# Patient Record
Sex: MALE | Race: BLACK OR AFRICAN AMERICAN
[De-identification: names, ages, dates, MRNs, and addresses within clinical notes are randomized per-mention and may not be internally consistent; named-entity substitution may affect disease eponyms.]

---

## 2019-04-08 ENCOUNTER — HOSPITAL ENCOUNTER (EMERGENCY)
Dept: HOSPITAL 35 - ER | Age: 74
Discharge: HOME | End: 2019-04-08
Payer: COMMERCIAL

## 2019-04-08 VITALS — WEIGHT: 206 LBS | HEIGHT: 77 IN | BODY MASS INDEX: 24.32 KG/M2

## 2019-04-08 VITALS — SYSTOLIC BLOOD PRESSURE: 158 MMHG | DIASTOLIC BLOOD PRESSURE: 68 MMHG

## 2019-04-08 DIAGNOSIS — K40.90: Primary | ICD-10-CM

## 2019-04-08 DIAGNOSIS — E11.9: ICD-10-CM

## 2019-04-08 DIAGNOSIS — Z88.5: ICD-10-CM

## 2019-04-08 DIAGNOSIS — Z79.4: ICD-10-CM

## 2019-08-16 ENCOUNTER — HOSPITAL ENCOUNTER (OUTPATIENT)
Dept: HOSPITAL 35 - SLEEPLAB | Age: 74
End: 2019-08-16
Attending: INTERNAL MEDICINE
Payer: COMMERCIAL

## 2019-08-16 DIAGNOSIS — G47.33: Primary | ICD-10-CM

## 2019-08-18 NOTE — SLE
[facility]
[address]
[place]   [address]                     POLYSOMNOGRAPHY STUDY         
_______________________________________________________________________________
 
Name:       NAILA HERNANDEZ            Room #:                     REG OPAL 
SHIRIN#:      [id number]                       Account #:      [id number]  
Admission:  08/16/19    Attend Phys:    Kike Keller MD      
Discharge:              Date of Birth:  [birth year]  
                                                          Report #: [id number]
                                                                    [id number]   
_______________________________________________________________________________
THIS REPORT FOR:   //name//                          
 
CC: Kike Dial MD
 
DATE OF SERVICE:  08/16/2019
 
 
ATTENDING PHYSICIAN:  Dr. Minh Dial.
 
The patient is a 73-year-old who weighs 200 pounds with a BMI of 23.7.  The
patient's [place] score was 8.  The patient underwent split night study
performed at [facility].
 
During the night study, the patient spent 524 minutes in bed and slept for 426
minutes with a sleep efficiency of 81%.  Sleep latency was 6.2 minutes with a
REM latency of 61 minutes.  Overall, sleep architecture showed increased stage 1
and stage 2 sleep, normal slow wave and reduced REM sleep, which was 10% of
total sleep time.
 
During the initial diagnostic portion of the study, the patient slept for 237
minutes.  During that time, the patient had 1 obstructive apnea, no mixed or
central apneas.  The patient had 42 hypopneas.  The patient's apnea hypopnea
index was 10.9 per hour with a REM index of 21 per hour and a supine index of 18
per hour.
 
EKG monitoring revealed atrial fibrillation throughout.  Average heart rate was
51 beats per minute with a maximum 82 beats per minute.
 
No clinically significant PLMS observed.
 
Nocturnal oximetry study during the diagnostic portion revealed an average
oxygen saturation of 94% with the lowest of 85%.  A 1.2 minutes were spent in
oxygen saturation of less than 89%.
 
The patient met the criteria for CPAP initiation.  It was started at 7 cm water
and titrated up to 17 cm of water.  However, best results were seen at a
pressure of 16 cm water.  At that pressure, the patient slept for 44 minutes
including 2.5 minutes of REM sleep.  The patient had supine sleep as well.  The
patient's AHI was reduced to 0 per hour and oxygen saturation remained above
94%.
 
IMPRESSION:
1.  Mild sleep apnea-hypopnea syndrome with moderate increase during supine and
REM sleep.  Total AHI 10.9 per hour with a REM AHI of 21 per hour and a supine
 
 
 
[facility]
[address]
[place]   [address]                     POLYSOMNOGRAPHY STUDY         
_______________________________________________________________________________
 
Name:       [facility]            Room #:                     REG [facility].#:      [id number]                       Account #:      [id number]  
Admission:  08/16/19    Attend Phys:    Kike Keller MD      
Discharge:              Date of Birth:  [birth year]  
                                                          Report #: [id number]
                                                                    [id number]   
_______________________________________________________________________________
AHI of 18 per hour.
2.  No clinically significant periodic limb movements.
3.  No clinically significant nocturnal hypoxia.
4.  Abnormal EKG consistent with Atrial Fibrillation.
 
RECOMMENDATIONS:
1.  CPAP at 16 cm water completely eliminated the patient's sleep apnea and
should be used on a nightly basis.
2.  Follow up in 4-6 weeks to assess compliance with CPAP and to document
clinical improvement.
3.  Weight loss is strongly advised.
4.  Avoid CNS depressants.
5.  Cautioned regarding driving until symptoms of sleep apnea resolve with the
use of CPAP.
6.  Follow up with cardiology regarding abnormal EKG, if clinically indicated.
 
 
 
 
 
 
 
 
 
 
 
 
 
 
 
 
 
 
 
 
 
 
 
 
 
 
 
 
 
  <ELECTRONICALLY SIGNED>
   By: Kike Keller MD              
  08/18/19 2131
D: 08/18/19 1909                           _____________________________________
T: 08/18/19 1935                           Kike Keller MD                /nt

## 2019-12-08 ENCOUNTER — HOSPITAL ENCOUNTER (EMERGENCY)
Dept: HOSPITAL 35 - ER | Age: 74
Discharge: HOME | End: 2019-12-08
Payer: COMMERCIAL

## 2019-12-08 VITALS — BODY MASS INDEX: 23.97 KG/M2 | HEIGHT: 77 IN | WEIGHT: 203 LBS

## 2019-12-08 VITALS — DIASTOLIC BLOOD PRESSURE: 54 MMHG | SYSTOLIC BLOOD PRESSURE: 173 MMHG

## 2019-12-08 DIAGNOSIS — E11.9: ICD-10-CM

## 2019-12-08 DIAGNOSIS — Z98.890: ICD-10-CM

## 2019-12-08 DIAGNOSIS — Z79.4: ICD-10-CM

## 2019-12-08 DIAGNOSIS — Z88.5: ICD-10-CM

## 2019-12-08 DIAGNOSIS — J98.9: Primary | ICD-10-CM

## 2019-12-08 DIAGNOSIS — I10: ICD-10-CM

## 2020-02-10 ENCOUNTER — HOSPITAL ENCOUNTER (EMERGENCY)
Dept: HOSPITAL 35 - ER | Age: 75
Discharge: HOME | End: 2020-02-10
Payer: COMMERCIAL

## 2020-02-10 VITALS — WEIGHT: 210.01 LBS | BODY MASS INDEX: 24.8 KG/M2 | HEIGHT: 77 IN

## 2020-02-10 VITALS — DIASTOLIC BLOOD PRESSURE: 58 MMHG | SYSTOLIC BLOOD PRESSURE: 151 MMHG

## 2020-02-10 DIAGNOSIS — Z88.6: ICD-10-CM

## 2020-02-10 DIAGNOSIS — Z79.4: ICD-10-CM

## 2020-02-10 DIAGNOSIS — R19.7: Primary | ICD-10-CM

## 2020-02-10 DIAGNOSIS — E11.9: ICD-10-CM

## 2020-02-10 LAB
ALBUMIN SERPL-MCNC: 3.2 G/DL (ref 3.4–5)
ALT SERPL-CCNC: 31 U/L (ref 30–65)
ANION GAP SERPL CALC-SCNC: 11 MMOL/L (ref 7–16)
AST SERPL-CCNC: 21 U/L (ref 15–37)
BASOPHILS NFR BLD AUTO: 0 % (ref 0–2)
BILIRUB DIRECT SERPL-MCNC: < 0.1 MG/DL
BILIRUB SERPL-MCNC: 0.4 MG/DL
BILIRUB UR-MCNC: NEGATIVE MG/DL
BUN SERPL-MCNC: 29 MG/DL (ref 7–18)
CALCIUM SERPL-MCNC: 8.3 MG/DL (ref 8.5–10.1)
CHLORIDE SERPL-SCNC: 107 MMOL/L (ref 98–107)
CO2 SERPL-SCNC: 22 MMOL/L (ref 21–32)
COLOR UR: YELLOW
CREAT SERPL-MCNC: 1.5 MG/DL (ref 0.7–1.3)
EOSINOPHIL NFR BLD: 1 % (ref 0–3)
ERYTHROCYTE [DISTWIDTH] IN BLOOD BY AUTOMATED COUNT: 16.1 % (ref 10.5–14.5)
GLUCOSE SERPL-MCNC: 200 MG/DL (ref 74–106)
GRANULOCYTES NFR BLD MANUAL: 37 % (ref 36–66)
HCT VFR BLD CALC: 41.1 % (ref 42–52)
HGB BLD-MCNC: 13.4 GM/DL (ref 14–18)
KETONES UR STRIP-MCNC: NEGATIVE MG/DL
LIPASE: 55 U/L (ref 73–393)
LYMPHOCYTES NFR BLD AUTO: 42 % (ref 24–44)
MCH RBC QN AUTO: 28.8 PG (ref 26–34)
MCHC RBC AUTO-ENTMCNC: 32.6 G/DL (ref 28–37)
MCV RBC: 88.2 FL (ref 80–100)
MONOCYTES NFR BLD: 17 % (ref 1–8)
NEUTROPHILS # BLD: 1.6 THOU/UL (ref 1.4–8.2)
NEUTS BAND NFR BLD: 3 % (ref 0–8)
PLATELET # BLD EST: NORMAL 10*3/UL
PLATELET # BLD: 164 THOU/UL (ref 150–400)
POTASSIUM SERPL-SCNC: 4.2 MMOL/L (ref 3.5–5.1)
PROT SERPL-MCNC: 6.8 G/DL (ref 6.4–8.2)
RBC # BLD AUTO: 4.66 MIL/UL (ref 4.5–6)
RBC # UR STRIP: (no result) /UL
RBC MORPH BLD: NORMAL
SODIUM SERPL-SCNC: 140 MMOL/L (ref 136–145)
SP GR UR STRIP: 1.02 (ref 1–1.03)
URINE CLARITY: CLEAR
URINE GLUCOSE-RANDOM*: NEGATIVE
URINE LEUKOCYTES-REFLEX: NEGATIVE
URINE NITRITE-REFLEX: NEGATIVE
URINE PROTEIN (DIPSTICK): NEGATIVE
UROBILINOGEN UR STRIP-ACNC: 0.2 E.U./DL (ref 0.2–1)
WBC # BLD AUTO: 4 THOU/UL (ref 4–11)

## 2020-02-13 NOTE — EKG
[address]
[place]   [address]                     ELECTROCARDIOGRAM REPORT      
_______________________________________________________________________________
 
Name:       NAILA HERNANDEZ            Room #:                     DEP [facility]#:      [id number]                       Account #:      [id number]  
Admission:  02/10/20    Attend Phys:                          
Discharge:  02/10/20    Date of Birth:  [birth year]  
                                                          Report #: [id number]
                                                                    [id number]
_______________________________________________________________________________
THIS REPORT FOR:  
 
cc:  Abisai De Leon David J. DO Couchonnal, Luis F. MD                                            ~
THIS REPORT FOR:   //name//                          
 
                          ED
                                       
Test Date:    2020-02-10               Test Time:    11:24:09
Pat Name:     NAILA HERNANDEZ       Department:   
Patient ID:   [id number]            Room:          
Gender:                               Technician:   [facility]
:          1945               Requested By: Kiki Garay
Order Number: [id number] MD:   Hosea Zaragoza
                                 Measurements
Intervals                              Axis          
Rate:         49                       P:            59
NV:           178                      QRS:          71
QRSD:         98                       T:            252
QT:           433                                    
QTc:          391                                    
                           Interpretive Statements
Sinus bradycardia
Atrial premature complex
Compared to ECG 08/10/2016 09:49:08
 
Electronically Signed On 2- 16:53:33 CST by Hosea Zaragoza
[email or web address]
 
 
 
 
 
 
 
 
 
 
 
 
 
 
 
  <ELECTRONICALLY SIGNED>
   By: Hosea Zaragoza MD        
  02/10/20     4103
D: 02/10/20 1124                           _____________________________________
T: 02/10/20 1124                           Hosea Zaragoza MD          /EPI

## 2020-06-23 ENCOUNTER — HOSPITAL ENCOUNTER (INPATIENT)
Dept: HOSPITAL 35 - ER | Age: 75
LOS: 8 days | Discharge: HOME | DRG: 853 | End: 2020-07-01
Attending: HOSPITALIST | Admitting: HOSPITALIST
Payer: COMMERCIAL

## 2020-06-23 VITALS — SYSTOLIC BLOOD PRESSURE: 137 MMHG | DIASTOLIC BLOOD PRESSURE: 55 MMHG

## 2020-06-23 VITALS — SYSTOLIC BLOOD PRESSURE: 143 MMHG | DIASTOLIC BLOOD PRESSURE: 47 MMHG

## 2020-06-23 VITALS — SYSTOLIC BLOOD PRESSURE: 149 MMHG | DIASTOLIC BLOOD PRESSURE: 54 MMHG

## 2020-06-23 VITALS — HEIGHT: 77 IN | BODY MASS INDEX: 24.56 KG/M2 | WEIGHT: 208 LBS

## 2020-06-23 VITALS — SYSTOLIC BLOOD PRESSURE: 150 MMHG | DIASTOLIC BLOOD PRESSURE: 60 MMHG

## 2020-06-23 VITALS — DIASTOLIC BLOOD PRESSURE: 61 MMHG | SYSTOLIC BLOOD PRESSURE: 171 MMHG

## 2020-06-23 VITALS — DIASTOLIC BLOOD PRESSURE: 573 MMHG | SYSTOLIC BLOOD PRESSURE: 147 MMHG

## 2020-06-23 VITALS — DIASTOLIC BLOOD PRESSURE: 51 MMHG | SYSTOLIC BLOOD PRESSURE: 131 MMHG

## 2020-06-23 DIAGNOSIS — E78.5: ICD-10-CM

## 2020-06-23 DIAGNOSIS — E11.42: ICD-10-CM

## 2020-06-23 DIAGNOSIS — E11.22: ICD-10-CM

## 2020-06-23 DIAGNOSIS — Z88.6: ICD-10-CM

## 2020-06-23 DIAGNOSIS — Z98.41: ICD-10-CM

## 2020-06-23 DIAGNOSIS — I48.91: ICD-10-CM

## 2020-06-23 DIAGNOSIS — Z87.891: ICD-10-CM

## 2020-06-23 DIAGNOSIS — Z86.010: ICD-10-CM

## 2020-06-23 DIAGNOSIS — I47.1: ICD-10-CM

## 2020-06-23 DIAGNOSIS — M86.8X7: ICD-10-CM

## 2020-06-23 DIAGNOSIS — E11.69: ICD-10-CM

## 2020-06-23 DIAGNOSIS — Z79.82: ICD-10-CM

## 2020-06-23 DIAGNOSIS — E78.00: ICD-10-CM

## 2020-06-23 DIAGNOSIS — N40.0: ICD-10-CM

## 2020-06-23 DIAGNOSIS — Z79.899: ICD-10-CM

## 2020-06-23 DIAGNOSIS — N17.0: ICD-10-CM

## 2020-06-23 DIAGNOSIS — E83.42: ICD-10-CM

## 2020-06-23 DIAGNOSIS — G47.00: ICD-10-CM

## 2020-06-23 DIAGNOSIS — E11.51: ICD-10-CM

## 2020-06-23 DIAGNOSIS — Z20.828: ICD-10-CM

## 2020-06-23 DIAGNOSIS — I12.9: ICD-10-CM

## 2020-06-23 DIAGNOSIS — Z88.8: ICD-10-CM

## 2020-06-23 DIAGNOSIS — A41.9: Primary | ICD-10-CM

## 2020-06-23 DIAGNOSIS — N18.9: ICD-10-CM

## 2020-06-23 DIAGNOSIS — Z60.2: ICD-10-CM

## 2020-06-23 LAB
ANION GAP SERPL CALC-SCNC: 5 MMOL/L (ref 7–16)
BASOPHILS NFR BLD AUTO: 0.7 % (ref 0–2)
BILIRUB UR-MCNC: NEGATIVE MG/DL
BUN SERPL-MCNC: 25 MG/DL (ref 7–18)
CALCIUM SERPL-MCNC: 9.1 MG/DL (ref 8.5–10.1)
CHLORIDE SERPL-SCNC: 100 MMOL/L (ref 98–107)
CO2 SERPL-SCNC: 30 MMOL/L (ref 21–32)
COLOR UR: YELLOW
CREAT SERPL-MCNC: 1.6 MG/DL (ref 0.7–1.3)
EOSINOPHIL NFR BLD: 0.4 % (ref 0–3)
ERYTHROCYTE [DISTWIDTH] IN BLOOD BY AUTOMATED COUNT: 14.5 % (ref 10.5–14.5)
GLUCOSE SERPL-MCNC: 109 MG/DL (ref 74–106)
GRANULOCYTES NFR BLD MANUAL: 77.7 % (ref 36–66)
HCT VFR BLD CALC: 40.9 % (ref 42–52)
HGB BLD-MCNC: 13.4 GM/DL (ref 14–18)
KETONES UR STRIP-MCNC: (no result) MG/DL
LYMPHOCYTES NFR BLD AUTO: 12.3 % (ref 24–44)
MCH RBC QN AUTO: 30.1 PG (ref 26–34)
MCHC RBC AUTO-ENTMCNC: 32.8 G/DL (ref 28–37)
MCV RBC: 91.6 FL (ref 80–100)
MONOCYTES NFR BLD: 8.9 % (ref 1–8)
NEUTROPHILS # BLD: 12.8 THOU/UL (ref 1.4–8.2)
PLATELET # BLD: 173 THOU/UL (ref 150–400)
POTASSIUM SERPL-SCNC: 4.3 MMOL/L (ref 3.5–5.1)
RBC # BLD AUTO: 4.47 MIL/UL (ref 4.5–6)
RBC # UR STRIP: (no result) /UL
RBC #/AREA URNS HPF: (no result) /HPF (ref 0–2)
SODIUM SERPL-SCNC: 135 MMOL/L (ref 136–145)
SP GR UR STRIP: 1.02 (ref 1–1.03)
SQUAMOUS: (no result) /LPF (ref 0–3)
URINE CLARITY: CLEAR
URINE GLUCOSE-RANDOM*: NEGATIVE
URINE LEUKOCYTES-REFLEX: NEGATIVE
URINE NITRITE-REFLEX: NEGATIVE
URINE PROTEIN (DIPSTICK): NEGATIVE
UROBILINOGEN UR STRIP-ACNC: 1 E.U./DL (ref 0.2–1)
WBC # BLD AUTO: 16.5 THOU/UL (ref 4–11)

## 2020-06-23 PROCEDURE — 10194: CPT

## 2020-06-23 PROCEDURE — 62900: CPT

## 2020-06-23 PROCEDURE — 50386 REMOVE STENT VIA TRANSURETH: CPT

## 2020-06-23 PROCEDURE — 62110: CPT

## 2020-06-23 PROCEDURE — 70005: CPT

## 2020-06-23 PROCEDURE — 57091: CPT

## 2020-06-23 PROCEDURE — 56527: CPT

## 2020-06-23 PROCEDURE — 50101: CPT

## 2020-06-23 PROCEDURE — 10081 I&D PILONIDAL CYST COMP: CPT

## 2020-06-23 PROCEDURE — B41GYZZ FLUOROSCOPY OF LEFT LOWER EXTREMITY ARTERIES USING OTHER CONTRAST: ICD-10-PCS | Performed by: INTERNAL MEDICINE

## 2020-06-23 PROCEDURE — 50010 RENAL EXPLORATION: CPT

## 2020-06-23 SDOH — SOCIAL STABILITY - SOCIAL INSECURITY: PROBLEMS RELATED TO LIVING ALONE: Z60.2

## 2020-06-23 NOTE — NUR
PT. ARRIVED AT FLOOR CLOSE TO NOON; PT. AOX4; NO C/O PAIN; SB-SR ON THE
MONITOR; R. TOE PICTURE TAKEN; ADMISSION PERFORMED; EDUCATED ABOUT FALL
PREVENTIONS; ST. UNDERSTANDING' EDUCATED ABOUT I/O; ST. UNDERSTANDING; IV
FLUIDS STARTED; BS DURING DINNER ON THE 200s; PT. ST. TAKING 11 UNITS OF
INSULIN BEFORE MEALS; PHYSICIAN NOTIFIED; ORDERS RECEIVED; ASSESSMENT AS
CHARGED; FOLLOWING POC; PASSED ON REPORT;

## 2020-06-24 VITALS — DIASTOLIC BLOOD PRESSURE: 62 MMHG | SYSTOLIC BLOOD PRESSURE: 133 MMHG

## 2020-06-24 VITALS — SYSTOLIC BLOOD PRESSURE: 137 MMHG | DIASTOLIC BLOOD PRESSURE: 60 MMHG

## 2020-06-24 VITALS — SYSTOLIC BLOOD PRESSURE: 130 MMHG | DIASTOLIC BLOOD PRESSURE: 51 MMHG

## 2020-06-24 VITALS — DIASTOLIC BLOOD PRESSURE: 39 MMHG | SYSTOLIC BLOOD PRESSURE: 138 MMHG

## 2020-06-24 VITALS — SYSTOLIC BLOOD PRESSURE: 142 MMHG | DIASTOLIC BLOOD PRESSURE: 54 MMHG

## 2020-06-24 VITALS — SYSTOLIC BLOOD PRESSURE: 143 MMHG | DIASTOLIC BLOOD PRESSURE: 54 MMHG

## 2020-06-24 VITALS — DIASTOLIC BLOOD PRESSURE: 53 MMHG | SYSTOLIC BLOOD PRESSURE: 134 MMHG

## 2020-06-24 LAB
ANION GAP SERPL CALC-SCNC: 10 MMOL/L (ref 7–16)
BUN SERPL-MCNC: 21 MG/DL (ref 7–18)
CALCIUM SERPL-MCNC: 8.2 MG/DL (ref 8.5–10.1)
CHLORIDE SERPL-SCNC: 101 MMOL/L (ref 98–107)
CHOLEST SERPL-MCNC: 101 MG/DL (ref ?–200)
CO2 SERPL-SCNC: 23 MMOL/L (ref 21–32)
CREAT SERPL-MCNC: 1.4 MG/DL (ref 0.7–1.3)
ERYTHROCYTE [DISTWIDTH] IN BLOOD BY AUTOMATED COUNT: 14.1 % (ref 10.5–14.5)
GLUCOSE SERPL-MCNC: 132 MG/DL (ref 74–106)
HCT VFR BLD CALC: 35.6 % (ref 42–52)
HDLC SERPL-MCNC: 53 MG/DL (ref 40–?)
HGB BLD-MCNC: 11.7 GM/DL (ref 14–18)
LDLC SERPL-MCNC: 39 MG/DL (ref ?–100)
MCH RBC QN AUTO: 30.1 PG (ref 26–34)
MCHC RBC AUTO-ENTMCNC: 32.8 G/DL (ref 28–37)
MCV RBC: 91.9 FL (ref 80–100)
PLATELET # BLD: 157 THOU/UL (ref 150–400)
POTASSIUM SERPL-SCNC: 3.9 MMOL/L (ref 3.5–5.1)
RBC # BLD AUTO: 3.88 MIL/UL (ref 4.5–6)
SODIUM SERPL-SCNC: 134 MMOL/L (ref 136–145)
TC:HDL: 1.9 RATIO
TRIGL SERPL-MCNC: 49 MG/DL (ref ?–150)
VLDLC SERPL CALC-MCNC: 10 MG/DL (ref ?–40)
WBC # BLD AUTO: 11.1 THOU/UL (ref 4–11)

## 2020-06-24 PROCEDURE — 0Y6P0Z0 DETACHMENT AT RIGHT 1ST TOE, COMPLETE, OPEN APPROACH: ICD-10-PCS | Performed by: ORTHOPAEDIC SURGERY

## 2020-06-24 NOTE — NUR
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
POSTERIOR TIBIAL ARTERY.  COVID NEGATIVE SO CLEARED FOR SURGERY.  CARDIOLOGY
AND IR CONSULTS TODAY.  BLOOD SUGAR STABLE TODAY DESPITE NPO STATUS.  NO
HYPOGLYCEMIA NOTED. DOWN TO OR AT 1500 TODAY IN STABLE CONDITION

## 2020-06-24 NOTE — O
[facility]
[address]
[place]   [address]                     OPERATIVE REPORT              
_______________________________________________________________________________
 
Name:       NAILA HERNANDEZ            Room #:         209-P       ADM IN  
M.R.#:      [id number]                       Account #:      [id number]  
Admission:  06/23/20    Attend Phys:    Nick Choi MD     
Discharge:              Date of Birth:  [birth year]  
                                                          Report #: [id number]
                                                                    [id number]   
_______________________________________________________________________________
THIS REPORT FOR:  
 
cc:  Abisai De Leon David J. DO McCabe, Michael P. MD                                         ~
CC: Abisai Choi
 
DATE OF SERVICE:  06/23/2020
 
 
SERVICE:  Orthopedics.
 
FACILITY:  [facility].
 
SURGEON:  Radhames Mendez MD
 
ASSISTANT:  Christine Lindo NP.
 
PREOPERATIVE DIAGNOSIS:  Osteomyelitis, right great toe distal phalanx.
 
POSTOPERATIVE DIAGNOSIS:  Osteomyelitis, right great toe distal phalanx.
 
PROCEDURE:  Right first toe amputation.
 
COMPLICATIONS:  None.
 
DRAINS:  None.
 
SPECIMENS:  Great toe.
 
HISTORY:  The patient is a 74-year-old diabetic who had a nonhealing wound on
the right first toe that had ultimately progressed to osteomyelitis of the
distal phalanx.  He had failed conservative treatment and was indicated for
surgical treatment for amputation.  He had good pulses in left foot; however, he
felt that first toe amputation would be the most viable.  He gave full informed
consent and wished to move forward in this direction after the risks, benefits,
alternatives and indications for surgery were discussed with him and his
daughter in detail.  Risks include pain, bleeding, infection, wound dehiscence,
need for further surgery including higher level amputation as well as
complications related to anesthesia such as stroke, heart attack, pulmonary
complications, thromboembolic disease and death.  Despite these, he wished to
proceed.
 
PROCEDURE IN DETAIL:  After right first toe was correctly identified as the
operative extremity, the patient was taken to the operating room where [address]   [address]                     OPERATIVE REPORT              
_______________________________________________________________________________
 
Name:       NAILA HERNANDEZ            Room #:         209-P       [facility] [place]  
..#:      [id number]                       Account #:      [id number]  
Admission:  06/23/20    Attend Phys:    Nick Choi MD     
Discharge:              Date of Birth:  [birth year]  
                                                          Report #: [id number]
                                                                    [id number]   
_______________________________________________________________________________
anesthesia was induced without complications, was padded appropriately. 
Prophylactic antibiotics were not administered in the surgery itself as the
patient is already in a regimen in the hospital.  We did not use a tourniquet
for the procedure.  The patient was padded appropriately.  Timeout procedure was
performed after the right leg was prepped and draped in standard sterile
fashion.
 
[place] incision was made over the distal aspect of the proximal phalanx and
then the toe was disarticulated at the interphalangeal joint and sent for
specimen.  The proximal phalanx was then exposed and the periosteum was
retracted and resected and then the proximal phalanx was cut transversely at the
level of the proximal metaphysis.  Sharp edges were resected with a rongeur and
contouring was completed on the plantar and medial sides and then the wound was
copiously irrigated.  The skin flaps were cut down to the appropriate sizing for
a non-tension soft tissue closure without excessive soft tissue potential
cavity.  After this was completed, the skin was closed with 3-0 Vicryl suture
followed by 3-0 nylon suture.  Sterile dressing was applied and then postop
shoe.  The patient was awakened from anesthesia and taken to recovery room in
stable condition.  No complications.  All counts were recorded as correct.
 
 
 
 
 
 
 
 
 
 
 
 
 
 
 
 
 
 
 
 
 
 
 
 
 
  <ELECTRONICALLY SIGNED>
   By: Radhames Mendez MD         
  06/24/20     2329
D: 06/24/20 2213                           _____________________________________
T: 06/24/20 2225                           Radhames Mendez MD           /nt

## 2020-06-24 NOTE — NUR
PER CHART REVIEW, Pt TO HAVE FIRST TOE AMPUTATION. WILL HOLD PT AND AWAIT FOR
NEW PT ORDERS POST AMPUTATION WITH UPDATED WB RESTRICTIONS.

## 2020-06-24 NOTE — NUR
ASSUMED PT CARE AT THE CHANGE OF SHIFT, PT IS AWAKE, ALERT AND ORIENTED,
ASSESSMENTS AS CHARTED, PT ON ROOM AIR, NO DISTRESS NOTED, COVID TEST SWAB
DONE, BS STABLE, MEICATED AS ORDERED, MRSA SWAB SEND TO LAB, REMAINED NPO
AFTER MIDNIGHT,RESTING IN BED, WILL CONTINIUE TO MONITOR

## 2020-06-25 VITALS — SYSTOLIC BLOOD PRESSURE: 130 MMHG | DIASTOLIC BLOOD PRESSURE: 46 MMHG

## 2020-06-25 VITALS — SYSTOLIC BLOOD PRESSURE: 128 MMHG | DIASTOLIC BLOOD PRESSURE: 48 MMHG

## 2020-06-25 VITALS — SYSTOLIC BLOOD PRESSURE: 122 MMHG | DIASTOLIC BLOOD PRESSURE: 61 MMHG

## 2020-06-25 VITALS — DIASTOLIC BLOOD PRESSURE: 64 MMHG | SYSTOLIC BLOOD PRESSURE: 171 MMHG

## 2020-06-25 VITALS — DIASTOLIC BLOOD PRESSURE: 46 MMHG | SYSTOLIC BLOOD PRESSURE: 130 MMHG

## 2020-06-25 VITALS — SYSTOLIC BLOOD PRESSURE: 130 MMHG | DIASTOLIC BLOOD PRESSURE: 52 MMHG

## 2020-06-25 LAB
ANION GAP SERPL CALC-SCNC: 5 MMOL/L (ref 7–16)
BUN SERPL-MCNC: 15 MG/DL (ref 7–18)
CALCIUM SERPL-MCNC: 8.1 MG/DL (ref 8.5–10.1)
CHLORIDE SERPL-SCNC: 101 MMOL/L (ref 98–107)
CO2 SERPL-SCNC: 28 MMOL/L (ref 21–32)
CREAT SERPL-MCNC: 1.4 MG/DL (ref 0.7–1.3)
ERYTHROCYTE [DISTWIDTH] IN BLOOD BY AUTOMATED COUNT: 14.8 % (ref 10.5–14.5)
EST. AVERAGE GLUCOSE BLD GHB EST-MCNC: 180 MG/DL
GLUCOSE SERPL-MCNC: 190 MG/DL (ref 74–106)
GLYCOHEMOGLOBIN (HGB A1C): 7.9 % (ref 4.8–5.6)
HCT VFR BLD CALC: 39 % (ref 42–52)
HGB BLD-MCNC: 12.6 GM/DL (ref 14–18)
MAGNESIUM SERPL-MCNC: 1.8 MG/DL (ref 1.8–2.4)
MCH RBC QN AUTO: 30.1 PG (ref 26–34)
MCHC RBC AUTO-ENTMCNC: 32.3 G/DL (ref 28–37)
MCV RBC: 93 FL (ref 80–100)
PLATELET # BLD: 150 THOU/UL (ref 150–400)
POTASSIUM SERPL-SCNC: 4.2 MMOL/L (ref 3.5–5.1)
RBC # BLD AUTO: 4.19 MIL/UL (ref 4.5–6)
SODIUM SERPL-SCNC: 134 MMOL/L (ref 136–145)
WBC # BLD AUTO: 8 THOU/UL (ref 4–11)

## 2020-06-25 NOTE — NUR
IN ADDITION TO OT VARIANCE WRITTEN 6/24, REQUESTING NEW THERAPY ORDERS WITH
CLARIFIED WB STATUS. THANK YOU.

## 2020-06-25 NOTE — NUR
ASSUMED CARE OF PATIENT AT 0700. ASSESSMENT COMPLETED. PATIENT USING RIGHT
POST OP SHOE AND KNEE SCOOTER TO AMBULATE AROUND THE ROOM. PATIENT HAS LEARNED
THIS SKILL AND PERFORMING QUITE WELL. DENIES ANY PAIN. ACCU CHECKS BEING
PERFORMED AC&HS, COVERED AT LUNCH AND DINNER WITH SLIDING SCALE. PATIENT'S
DAUGHTER AT THE BEDSIDE FOR THE MAJORITY OF THE DAY. PATIENT TO CONTINUE WITH
POC.

## 2020-06-25 NOTE — NUR
chart review. cm visited with pt at bedside and his daughter channing. cm cont
to wear own facial mask during visit. intro to cm and dcp ie knee scooter. "
if insurance wont cover it i will want to know the one he been using this
afternoon is been nice, also trying to get dad to come stay with me for few
weeks when gets dc so can help him but he not convinced yet." daughter . per
pt " live in house with son, no steps to enter, stair to basement. manage own
medication and insulin pen. independent, able to cook, clean and get self
dressed. still drive vehicle. no rehab or hh in past"/kevin. will cont
following as needed for dc needs, possible hh for dressing changes to right
foot and knee scooter rt wt bearing status and healing.

## 2020-06-25 NOTE — NUR
ASSUMED PT CARE AT 1900, PT IS AWAKE, ALERT AND ORIENTEDX4, ASSESSMENTS AS
CHARTED, WOUND SITE CDI, C/O PAIN, PAIN MEDICATION GIVEN PRNX1 WITH PARTIAL
RELIEF, MRSA RESULTS NEGATIVE, SA/PVCS ON THE MONITOR, WILL CONTINUE TO
MONITOR

## 2020-06-26 VITALS — SYSTOLIC BLOOD PRESSURE: 154 MMHG | DIASTOLIC BLOOD PRESSURE: 51 MMHG

## 2020-06-26 VITALS — SYSTOLIC BLOOD PRESSURE: 152 MMHG | DIASTOLIC BLOOD PRESSURE: 59 MMHG

## 2020-06-26 VITALS — SYSTOLIC BLOOD PRESSURE: 163 MMHG | DIASTOLIC BLOOD PRESSURE: 113 MMHG

## 2020-06-26 VITALS — DIASTOLIC BLOOD PRESSURE: 59 MMHG | SYSTOLIC BLOOD PRESSURE: 156 MMHG

## 2020-06-26 VITALS — DIASTOLIC BLOOD PRESSURE: 75 MMHG | SYSTOLIC BLOOD PRESSURE: 156 MMHG

## 2020-06-26 VITALS — SYSTOLIC BLOOD PRESSURE: 145 MMHG | DIASTOLIC BLOOD PRESSURE: 97 MMHG

## 2020-06-26 VITALS — DIASTOLIC BLOOD PRESSURE: 75 MMHG | SYSTOLIC BLOOD PRESSURE: 152 MMHG

## 2020-06-26 VITALS — DIASTOLIC BLOOD PRESSURE: 59 MMHG | SYSTOLIC BLOOD PRESSURE: 155 MMHG

## 2020-06-26 VITALS — SYSTOLIC BLOOD PRESSURE: 156 MMHG | DIASTOLIC BLOOD PRESSURE: 59 MMHG

## 2020-06-26 VITALS — DIASTOLIC BLOOD PRESSURE: 59 MMHG | SYSTOLIC BLOOD PRESSURE: 159 MMHG

## 2020-06-26 VITALS — DIASTOLIC BLOOD PRESSURE: 56 MMHG | SYSTOLIC BLOOD PRESSURE: 151 MMHG

## 2020-06-26 VITALS — DIASTOLIC BLOOD PRESSURE: 82 MMHG | SYSTOLIC BLOOD PRESSURE: 172 MMHG

## 2020-06-26 LAB
ANION GAP SERPL CALC-SCNC: 7 MMOL/L (ref 7–16)
BUN SERPL-MCNC: 12 MG/DL (ref 7–18)
CALCIUM SERPL-MCNC: 8.2 MG/DL (ref 8.5–10.1)
CHLORIDE SERPL-SCNC: 102 MMOL/L (ref 98–107)
CO2 SERPL-SCNC: 26 MMOL/L (ref 21–32)
CREAT SERPL-MCNC: 1.3 MG/DL (ref 0.7–1.3)
ERYTHROCYTE [DISTWIDTH] IN BLOOD BY AUTOMATED COUNT: 14.3 % (ref 10.5–14.5)
GLUCOSE SERPL-MCNC: 130 MG/DL (ref 74–106)
HCT VFR BLD CALC: 35.2 % (ref 42–52)
HGB BLD-MCNC: 11.6 GM/DL (ref 14–18)
MAGNESIUM SERPL-MCNC: 1.7 MG/DL (ref 1.8–2.4)
MCH RBC QN AUTO: 30.1 PG (ref 26–34)
MCHC RBC AUTO-ENTMCNC: 32.9 G/DL (ref 28–37)
MCV RBC: 91.6 FL (ref 80–100)
PLATELET # BLD: 174 THOU/UL (ref 150–400)
POTASSIUM SERPL-SCNC: 3.7 MMOL/L (ref 3.5–5.1)
RBC # BLD AUTO: 3.85 MIL/UL (ref 4.5–6)
SODIUM SERPL-SCNC: 135 MMOL/L (ref 136–145)
WBC # BLD AUTO: 7.6 THOU/UL (ref 4–11)

## 2020-06-26 PROCEDURE — 047T34Z DILATION OF RIGHT PERONEAL ARTERY WITH DRUG-ELUTING INTRALUMINAL DEVICE, PERCUTANEOUS APPROACH: ICD-10-PCS

## 2020-06-26 PROCEDURE — 047P34Z DILATION OF RIGHT ANTERIOR TIBIAL ARTERY WITH DRUG-ELUTING INTRALUMINAL DEVICE, PERCUTANEOUS APPROACH: ICD-10-PCS

## 2020-06-26 PROCEDURE — 4A023N7 MEASUREMENT OF CARDIAC SAMPLING AND PRESSURE, LEFT HEART, PERCUTANEOUS APPROACH: ICD-10-PCS

## 2020-06-26 PROCEDURE — 04CK3ZZ EXTIRPATION OF MATTER FROM RIGHT FEMORAL ARTERY, PERCUTANEOUS APPROACH: ICD-10-PCS | Performed by: RADIOLOGY

## 2020-06-26 PROCEDURE — 047J3DZ DILATION OF LEFT EXTERNAL ILIAC ARTERY WITH INTRALUMINAL DEVICE, PERCUTANEOUS APPROACH: ICD-10-PCS

## 2020-06-26 PROCEDURE — 04CP3ZZ EXTIRPATION OF MATTER FROM RIGHT ANTERIOR TIBIAL ARTERY, PERCUTANEOUS APPROACH: ICD-10-PCS | Performed by: INTERNAL MEDICINE

## 2020-06-26 PROCEDURE — B215YZZ FLUOROSCOPY OF LEFT HEART USING OTHER CONTRAST: ICD-10-PCS

## 2020-06-26 PROCEDURE — B41D1ZZ FLUOROSCOPY OF AORTA AND BILATERAL LOWER EXTREMITY ARTERIES USING LOW OSMOLAR CONTRAST: ICD-10-PCS | Performed by: RADIOLOGY

## 2020-06-26 PROCEDURE — 047K3DZ DILATION OF RIGHT FEMORAL ARTERY WITH INTRALUMINAL DEVICE, PERCUTANEOUS APPROACH: ICD-10-PCS

## 2020-06-26 PROCEDURE — B211YZZ FLUOROSCOPY OF MULTIPLE CORONARY ARTERIES USING OTHER CONTRAST: ICD-10-PCS

## 2020-06-26 PROCEDURE — B4181ZZ FLUOROSCOPY OF BILATERAL RENAL ARTERIES USING LOW OSMOLAR CONTRAST: ICD-10-PCS

## 2020-06-26 NOTE — CATHLAB
[facility]
[address]
[place]   [address]                   INVASIVE PROCEDURE REPORT     
_______________________________________________________________________________
 
Name:       NAILA HERNANDEZ            Room #:         209-P       ADM IN  
M.R.#:      [id number]                       Account #:      [id number]  
Admission:  06/23/20    Attend Phys:    Nick Choi MD     
Discharge:              Date of Birth:  [birth year]  
                                                          Report #: [id number]
                                                                    [id number]
_______________________________________________________________________________
THIS REPORT FOR:  
 
cc:  Abisai De Leon David J. DO Mancuso, Gerald M. MD [facility]                                        
                                                                       ~
 
--------------- APPROVED REPORT --------------
 
 
Study performed:  06/26/2020 15:54:48
 
Patient Details
Patient Status: In-Patient                  Room #: 
The patient is a 74 year-old male
 
Event Personnel
Jd Costa  Interventional Cardiologist, Yumiko Morrison Mahmood, Amber Monitor, Rohith Lu RN RN
 
Procedures Performed
Art Access - L femoral artery*  Left Heart Cath w/or w/o Coronaries 
[id number] [facility] Hemostasis w/ Mynx
 
Procedure Narrative
The patient was brought urgently to the Cardiac Catheterization 
Laboratory and was prepped and draped in a sterile manner. The was 
infiltrated with 1% Lidocaine subcutaneous anesthesia. A 7F 11CM 
BRITE-TIP sheath was inserted into the LFA^. Coronary angiography was 
performed using coronary diagnostic catheters. The right coronary 
system was accessed and visualized with a JR 4 catheter. The left 
coronary system was accessed and visualized with a JL 4 catheter. The 
left ventricle was accessed and visualized with a Pigtail catheter. 
Left ventricular/Aortic Valve gradient assessed via catheter 
pullback. Left ventriculogram was performed in REESE projection. 
Closure device was deployed with a 7 Fr Mynx. The patient tolerated 
the procedure well and there were no complications associated with 
the procedure. There was no hematoma.
 
Intraoperative Conscious Sedation
Sedation start time:  15:59           Case end Time:  
16:28    
Fentanyl  200 mcg    Versed  4.0 mg  
 
Fluoro Time:    19.73 minutes    
Dose:     DAP 74199.00 cGycm2  246 mGy  
 
 
[facility]
[address]
[place]  [address]
Phone:  [phone]                    INVASIVE PROCEDURE REPORT     
_______________________________________________________________________________
 
Name:            JANE HERNANDEZVILLE            Room #:        209-P       [facility] [place]
M.R.#:           [id number]          Account #:     [id number]  
Admission:       06/23/20         Attend Phys:   Nick Choi MD 
Discharge:                  Date of Birth: [birth year]  
                         Report #:      [id number]
        [id number]
_______________________________________________________________________________
Contrast Type and Amount:  Visipaque 124 ml   
 
Hemodynamics
The aortic pressure is 172/45 mmHg with a mean of 91 mmHg. The left 
ventricular pressure is 163/15 mmHg with a mean of mmHg. The left 
ventricular end diastolic pressure is 35 mmHg. 
 
PCI Technique Lesion
Percutaneous coronary intervention was performed on the Prox anterior 
tibial.
 
PCI Technique Lesion 2
Percutaneous Coronary Intervention was performed on the 
Unspecified.
 
PCI Technique Lesion 3
Percutaneous Coronary Intervention was performed on the Distal sup 
femoral.
 
PCI Technique Lesion 4
Percutaneous Coronary Intervention was performed on the Common 
iliac.
 
Conclusion
1.  Hyperdynamic LV function apical hypertrophy EF 70%
#2 normal coronary anatomy in a right dominant system these are large 
widely patent coronary arteries a ramus branch is also evident no 
occlusive disease is noted.  Right dominant.
 
Recommendations and plan continue aggressive risk factor 
modification.  Patient underwent peripheral intervention see Dr. Short's dictation.
 
 
 
 
 
 
 
 
 
 
 
 
  <ELECTRONICALLY SIGNED>
   By: Jd Costa MD, [facility]   
  06/26/20 1713
D: 06/26/20 1713                           _____________________________________
T: 06/26/20 1713                           Jd Costa MD, FACC     /INF

## 2020-06-26 NOTE — NUR
ASSESSMENT AS DOCUMENTED.PT BEEN RESTING IN NO ACUTE DISTRESS.A/OX4.VSS.DENIES
PAIN.SA ON MONITOR.PT UP TO BR W/SBA.RIGHT FOOT DRESSING CDI.NPO AFTER MIDNOC
FOR IR ADN LEFT EUGENIO PROCEDURES TODAY.PT DENIES ANY NEEDS AT THIS TIME.WILL
CONT TO MONITOR.

## 2020-06-26 NOTE — NUR
FAVIO reviewed chart and spoke with nursing and attending physician. Pt currently
off the unit in the cath lab. FAVIO attempted to meet with pt several times
today. Pt off the unit for procedures. 5N consult ordered to evaluate pt for
admission to inpt acute rehab. FAVIO left HH list and info regarding knee scooter
rental/purchase info in pt's room for review. FAVIO is following to assist as
needed with discharge planning.

## 2020-06-27 VITALS — SYSTOLIC BLOOD PRESSURE: 152 MMHG | DIASTOLIC BLOOD PRESSURE: 71 MMHG

## 2020-06-27 VITALS — SYSTOLIC BLOOD PRESSURE: 147 MMHG | DIASTOLIC BLOOD PRESSURE: 73 MMHG

## 2020-06-27 VITALS — SYSTOLIC BLOOD PRESSURE: 142 MMHG | DIASTOLIC BLOOD PRESSURE: 49 MMHG

## 2020-06-27 VITALS — DIASTOLIC BLOOD PRESSURE: 52 MMHG | SYSTOLIC BLOOD PRESSURE: 146 MMHG

## 2020-06-27 VITALS — SYSTOLIC BLOOD PRESSURE: 138 MMHG | DIASTOLIC BLOOD PRESSURE: 56 MMHG

## 2020-06-27 VITALS — SYSTOLIC BLOOD PRESSURE: 153 MMHG | DIASTOLIC BLOOD PRESSURE: 58 MMHG

## 2020-06-27 VITALS — SYSTOLIC BLOOD PRESSURE: 146 MMHG | DIASTOLIC BLOOD PRESSURE: 52 MMHG

## 2020-06-27 VITALS — DIASTOLIC BLOOD PRESSURE: 56 MMHG | SYSTOLIC BLOOD PRESSURE: 138 MMHG

## 2020-06-27 LAB
ANION GAP SERPL CALC-SCNC: 12 MMOL/L (ref 7–16)
BUN SERPL-MCNC: 14 MG/DL (ref 7–18)
CALCIUM SERPL-MCNC: 8.2 MG/DL (ref 8.5–10.1)
CHLORIDE SERPL-SCNC: 105 MMOL/L (ref 98–107)
CO2 SERPL-SCNC: 22 MMOL/L (ref 21–32)
CREAT SERPL-MCNC: 1.4 MG/DL (ref 0.7–1.3)
ERYTHROCYTE [DISTWIDTH] IN BLOOD BY AUTOMATED COUNT: 14.2 % (ref 10.5–14.5)
GLUCOSE SERPL-MCNC: 117 MG/DL (ref 74–106)
HCT VFR BLD CALC: 35.5 % (ref 42–52)
HGB BLD-MCNC: 11.8 GM/DL (ref 14–18)
MAGNESIUM SERPL-MCNC: 1.6 MG/DL (ref 1.8–2.4)
MCH RBC QN AUTO: 30.3 PG (ref 26–34)
MCHC RBC AUTO-ENTMCNC: 33.2 G/DL (ref 28–37)
MCV RBC: 91.2 FL (ref 80–100)
PLATELET # BLD: 206 THOU/UL (ref 150–400)
POTASSIUM SERPL-SCNC: 4 MMOL/L (ref 3.5–5.1)
RBC # BLD AUTO: 3.89 MIL/UL (ref 4.5–6)
SODIUM SERPL-SCNC: 139 MMOL/L (ref 136–145)
WBC # BLD AUTO: 10.5 THOU/UL (ref 4–11)

## 2020-06-27 NOTE — NUR
ASSESSMENT AS DOCUMENTED. VSS. NO PAIN REPORTED BY PT. R GREAT TOE DRESSING
CLEANSED AND DRESSING CHANGED. PT REFUSED INSULIN PER EMAR. PT RESTING IN BED
WITH CALL LIGHT IN REACH. PT DAUGHTER AT BEDSIDE. PT STATES COUGHING UP BLOOD.
NO SOB OR CHEST PAIN. DR NOTIFIED. NO NEW ORDERS GIVEN AT THIS TIME. WILL
CONTINUE TO MONITOR.

## 2020-06-27 NOTE — NUR
ASSESSMENT AS DOCUMENTED.PT BEEN RESTING IN NO ACUTE DISTRESS.A/P ANGIOGRAM
AND CARDIAC EUGENIO.RIGHT GROIN DRESSING  CDI W/O HEMATOMA.A/OX4 SA ON
MONITOR.VSS.AFEBRILE.VOIDS VIA URINAL, ADEQUATE UO.RIGHT FOOT DRESSING CDI.PT
DENIES PAIN OR ANY DISTRESS AT THIS TIME.WILL CONT TO MONITOR PER POC.

## 2020-06-28 VITALS — DIASTOLIC BLOOD PRESSURE: 66 MMHG | SYSTOLIC BLOOD PRESSURE: 154 MMHG

## 2020-06-28 VITALS — DIASTOLIC BLOOD PRESSURE: 73 MMHG | SYSTOLIC BLOOD PRESSURE: 169 MMHG

## 2020-06-28 VITALS — SYSTOLIC BLOOD PRESSURE: 168 MMHG | DIASTOLIC BLOOD PRESSURE: 77 MMHG

## 2020-06-28 VITALS — SYSTOLIC BLOOD PRESSURE: 154 MMHG | DIASTOLIC BLOOD PRESSURE: 62 MMHG

## 2020-06-28 VITALS — SYSTOLIC BLOOD PRESSURE: 137 MMHG | DIASTOLIC BLOOD PRESSURE: 45 MMHG

## 2020-06-28 LAB
ANION GAP SERPL CALC-SCNC: 8 MMOL/L (ref 7–16)
BUN SERPL-MCNC: 14 MG/DL (ref 7–18)
CALCIUM SERPL-MCNC: 8.4 MG/DL (ref 8.5–10.1)
CHLORIDE SERPL-SCNC: 105 MMOL/L (ref 98–107)
CO2 SERPL-SCNC: 25 MMOL/L (ref 21–32)
CREAT SERPL-MCNC: 1.3 MG/DL (ref 0.7–1.3)
ERYTHROCYTE [DISTWIDTH] IN BLOOD BY AUTOMATED COUNT: 14 % (ref 10.5–14.5)
GLUCOSE SERPL-MCNC: 91 MG/DL (ref 74–106)
HCT VFR BLD CALC: 34.3 % (ref 42–52)
HGB BLD-MCNC: 11.6 GM/DL (ref 14–18)
MAGNESIUM SERPL-MCNC: 1.7 MG/DL (ref 1.8–2.4)
MCH RBC QN AUTO: 30.5 PG (ref 26–34)
MCHC RBC AUTO-ENTMCNC: 33.7 G/DL (ref 28–37)
MCV RBC: 90.2 FL (ref 80–100)
PLATELET # BLD: 220 THOU/UL (ref 150–400)
POTASSIUM SERPL-SCNC: 3.6 MMOL/L (ref 3.5–5.1)
RBC # BLD AUTO: 3.8 MIL/UL (ref 4.5–6)
SODIUM SERPL-SCNC: 138 MMOL/L (ref 136–145)
WBC # BLD AUTO: 10.7 THOU/UL (ref 4–11)

## 2020-06-28 NOTE — NUR
ASSUMED PT CARE AT 1900. PT IS ALERT AND ORIENTED. NO SIGN OF DISTRESS NOTED.
PT IS STABLE. FAMILY IS AT BEDSIDE BUT LEFT AFTER SHIFT CHANGE. CALL LIGHT
WITHIN REACH. ASSESSMENT COMPLETED AND DOCUMENTED. DENIES ANY PAIN. SCHEDULED
MEDS ADMINISTERED TO PT. CONTINUE TO MONITOR PT.NO FURTHER NEEDS AT THIS TIME.

## 2020-06-28 NOTE — NUR
ASSUMED CARE OF PATIENT AT 0700. ASSESSMENTS COMPLETED. RT BREATHING
TREATMENTS Q 6 HOURS. PATIENT DENIES ANY CHEST PAIN OR TIGHTNESS. PT
USING KNEE SCOOTER WHILE WEARING POST OP SHOE TO
BATHROOM AND MANAGING WELL. C/O NAUSEA TODAY WITH DECREASED APPETITE,
IMPROVED WITH ZOFRAN. PATIENT'S DAUGHTER AT BEDSIDE FOR ENTIRETY OF THE DAY.
DENIES ANY PAIN. PATIENT TO CONTINUE WITH POC.

## 2020-06-28 NOTE — EKG
[facility]
[address]
[place]   [address]                     ELECTROCARDIOGRAM REPORT      
_______________________________________________________________________________
 
Name:       NAILA HERNANDEZ            Room #:         209-P       ADM IN  
M.R.#:      [id number]                       Account #:      [id number]  
Admission:  20    Attend Phys:    Nick Choi MD     
Discharge:              Date of Birth:  [birth year]  
                                                          Report #: [id number]
                                                                    [id number]
_______________________________________________________________________________
THIS REPORT FOR:  
 
cc:  Abisai De Leon David J. DO Lundgren,David OKEEFE MD [facility]                                        ~
THIS REPORT FOR:   //name//                          
 
                          [facility]
                                       
Test Date:    2020               Test Time:    18:58:02
Pat Name:     NAILA HERNANDEZ       Department:   
Patient ID:   [id number]            Room:         209 
Gender:       M                        Technician:   CCU RN
:          1945               Requested By: Patricio Monroe
Order Number: [id number] MD:   David Yin
                                 Measurements
Intervals                              Axis          
Rate:         60                       P:            57
[place]:           166                      QRS:          73
QRSD:         94                       T:            243
QT:           457                                    
QTc:          457                                    
                           Interpretive Statements
Sinus rhythm
Abnrm T, probable ischemia, anterolateral lds
Compared to ECG 02/10/2020 11:24:09
Sinus bradycardia no longer present
Atrial premature complex(es) no longer present
Electronically Signed On 2020 9:59:34 CDT by David Yin
[email or web address]
 
 
 
 
 
 
 
 
 
 
 
 
 
 
  <ELECTRONICALLY SIGNED>
   By: David Yin MD, FAC   
  20     0959
D: 20                           _____________________________________
T: 20                           David Yin MD, FAC     /EPI

## 2020-06-29 VITALS — DIASTOLIC BLOOD PRESSURE: 67 MMHG | SYSTOLIC BLOOD PRESSURE: 158 MMHG

## 2020-06-29 VITALS — SYSTOLIC BLOOD PRESSURE: 124 MMHG | DIASTOLIC BLOOD PRESSURE: 47 MMHG

## 2020-06-29 VITALS — SYSTOLIC BLOOD PRESSURE: 141 MMHG | DIASTOLIC BLOOD PRESSURE: 57 MMHG

## 2020-06-29 VITALS — SYSTOLIC BLOOD PRESSURE: 140 MMHG | DIASTOLIC BLOOD PRESSURE: 59 MMHG

## 2020-06-29 LAB
ANION GAP SERPL CALC-SCNC: 6 MMOL/L (ref 7–16)
BUN SERPL-MCNC: 10 MG/DL (ref 7–18)
CALCIUM SERPL-MCNC: 8.5 MG/DL (ref 8.5–10.1)
CHLORIDE SERPL-SCNC: 105 MMOL/L (ref 98–107)
CO2 SERPL-SCNC: 27 MMOL/L (ref 21–32)
CREAT SERPL-MCNC: 1.3 MG/DL (ref 0.7–1.3)
ERYTHROCYTE [DISTWIDTH] IN BLOOD BY AUTOMATED COUNT: 14 % (ref 10.5–14.5)
GLUCOSE SERPL-MCNC: 153 MG/DL (ref 74–106)
HCT VFR BLD CALC: 33.5 % (ref 42–52)
HGB BLD-MCNC: 11.3 GM/DL (ref 14–18)
MAGNESIUM SERPL-MCNC: 1.8 MG/DL (ref 1.8–2.4)
MCH RBC QN AUTO: 30.5 PG (ref 26–34)
MCHC RBC AUTO-ENTMCNC: 33.6 G/DL (ref 28–37)
MCV RBC: 90.6 FL (ref 80–100)
PLATELET # BLD: 235 THOU/UL (ref 150–400)
POTASSIUM SERPL-SCNC: 3.9 MMOL/L (ref 3.5–5.1)
RBC # BLD AUTO: 3.7 MIL/UL (ref 4.5–6)
SODIUM SERPL-SCNC: 138 MMOL/L (ref 136–145)
WBC # BLD AUTO: 10.2 THOU/UL (ref 4–11)

## 2020-06-29 NOTE — PATH
[facility]
[address]
[place]   [address]                     PATHOLOGY RPT PROCEDURE       
_______________________________________________________________________________
 
Name:       NAILA HERNANDEZ            Room #:         209-P       ADM IN  
M.R.#:      [id number]     Account #:      [id number]  
Admission:  06/23/20    Date of Birth:  [birth year]  
Discharge:                                              Report #:    [id number]
                                                        Path Case #: [id number]
_______________________________________________________________________________
 
LCA Accession Number: [id number]
.                                                                01
Material submitted:                                        .
toe - RIGHT FIRST TOE. Modifiers: right, first
.                                                                01
Clinical history:                                          .
Right first toe osteomyelitis.
.                                                                02
**********************************************************************
Diagnosis:
Toe, right first toe, amputation:
- Skin and subcutaneous tissue showing ulceration along with marked acute
inflammation.
- Bone with acute osteomyelitis.
- Skin and bone margins viable and unremarkable.
.
(IUV:mml; 06/29/2020)
QL  06/29/2020  1244 Local
**********************************************************************
.                                                                02
Electronically signed:                                     .
Tamiko Sue MD, Pathologist
NPI- [id number]
.                                                                01
Gross description:                                         .
Received in formalin labeled "Naila Hernandez, right first toe" is a
toe amputation specimen received in three pieces.  The first piece is a
4.5 x 4.0 x 3.9 cm toe with attached skin, which has a smooth concave
cartilaginous disarticulation at the proximal margin.  The distal aspect
of the toe displays a tan-white nail measuring 2.2 x 0.8 x 0.3 cm.
Adjacent to the nail is a tan-white ulcerated lesion measuring 2.2 x 2.0 x
0.9 cm.  The lesion is located 2.7 cm from the proximal skin and soft
tissue margin and 4.6 cm from the disarticulation.  The margin is inked
black.  Also present within the container is a separate fragment of
tan-white skin measuring 3.4 x 1.3 x 0.5 cm, which is grossly
unremarkable.  The third fragment of tissue is a segment of tan white bone
measuring 3.1 x 2.2 x 1.7 cm.  One aspect of the bone has a convex
cartilaginous disarticulation and the opposite aspect has a smooth
surgical margin.  The margin is inked black.  Representative sections of
the specimen are submitted as follows:
A1-A2 representative cross-section of toe (decalcified)
A3 sections of separate skin
A4 cross section of separate bone (decalcified) [place] 6[place]25/[place])
[facility]  06/25/2020 194 Local
.                                                                02
Pathologist provided ICD-10:
M86.171, L08.9, L98.499
 
 
[address]   [address]                     PATHOLOGY RPT PROCEDURE       
_______________________________________________________________________________
 
Name:       NAILA HERNANDEZ            Room #:         209-P       ADM IN  
M.R.#:      [id number]     Account #:      [id number]  
Admission:  06/23/20    Date of Birth:  [birth year]  
Discharge:                                              Report #:    [id number]
                                                        Path Case #: [id number]
_______________________________________________________________________________
.                                                                02
CPT                                                        .
308034, [id number]
Specimen Comment: A courtesy copy of this report has been sent to [phone]
[phone]
Specimen Comment: [id number], [phone]
Specimen Comment: Report sent to ,DR BALGEY / DR CARBAJAL
***Performed at:  01
   [facility] [address], [place]  [id number]
   MD Dom Vogel MD Phone:  [phone]
***Performed at:  02
   [facility] [address]  [id number]
   MD Tamiko Sue MD Phone:  [phone]

## 2020-06-29 NOTE — NUR
SLEPT PART OF SHIFT. PATIENT WITH COMPLAINTS OF NAUSEA 2/3 THROUGH RECIEVING
IVPB ZOSYN. WORKING ON GOALS AND PLAN OF CARE FOR NOC. INFORMED KARISSA
FNP OF NAUSEA WITH ANTIBIOTIC. PASSED ON TO DAY RN IN REPORT TO LET DC DOCTOR
KNOW OF NAUSEA. TELEMETRY SHOW SR/SB 50-70'S WITH PAC AND PVC'S. HAD ONE BURST
OF  AND 4 BEATS VT WHILE SLEEPING. PATIENT ASYMPTOMATIC. UP TO BATHROOM
WITH KNEE SCOOTER AS NEEDED, PATIENT IS STEADY AND SAFE. PROGRESSING TOWARDS
GOALS FOR DISCHARGE TO DAUGHTER'S HOUSE TODAY. DENIES COMPLAINTS OF PAIN.
DRESSING TO RIGHT FOOT DRY/INTACT WITH ACE WRAP. CONTINUE TO ASSES CLOSELY.

## 2020-06-29 NOTE — HC
[facility]
[address]
[place]   [address]                     CONSULTATION                  
_______________________________________________________________________________
 
Name:       NAILA HERNANDEZ            Room #:         209-P       ADM IN  
M.R.#:      [id number]                       Account #:      [id number]  
Admission:  06/23/20    Attend Phys:    Nick Choi MD     
Discharge:              Date of Birth:  [birth year]  
                                                          Report #: [id number]
                                                                    [id number]   
_______________________________________________________________________________
THIS REPORT FOR:  
 
cc:  Abisai De Leon David J. DO Al-Mubaslat, Ahmad MD                                         ~
CC: Abisai Choi
 
DATE OF SERVICE:  06/26/2020
 
 
ENDOCRINE CONSULTATION NOTE
 
CONSULTING PHYSICIAN:  Dr. Monroe.
 
REASON FOR CONSULTATION:  Uncontrolled type 2 diabetes mellitus.
 
HISTORY OF PRESENT ILLNESS:  This is a 74-year-old male patient whose medical
background is noted for multiple issues including type 2 diabetes mellitus,
hypertension, and hyperlipidemia, who presented to the ER with complaints of a
nonhealing right great toe wound, bleeding and pain.  The patient was admitted
for further care and monitoring where he was eventually found to have
osteomyelitis.  He eventually underwent a right first toe amputation on
06/23/2020 and has been recovering since then.
 
Again, the patient is known to have type 2 diabetes mellitus for over 10 years. 
His current regimen consists of Lantus insulin 20 units q.p.m. in addition to
Humalog insulin at an average of 10 units t.i.d. a.c.  The patient reports blood
glucose control that is mostly in the low to mid 100 mg/dL with occasional
excursions over 200 mg/dL, specifically in relation to dietary indiscretion, and
rare occurrences of mild hypoglycemia that has never required third party
assistance.
 
The patient believes that he has some sort of diabetic eye disease, possibly
macular edema, he is not aware of any issues pertaining to chronic kidney
disease or peripheral neuropathy.  The patient does not have a prior history of
CAD, but is known to have atrial fibrillation.
 
The patient is also hyperlipidemic and is maintained on treatment with
atorvastatin 40 mg daily.  He is also hypertensive and receives metoprolol 50 mg
daily.
 
REVIEW OF SYSTEMS:
CONSTITUTIONAL:  Slight issues with fatigue, tiredness, but not fever or chills
or body weight changes.
HEENT:  Negative for sore throat, sinus pain, ear drainage.
 
 
 
[facility]
[address] [place]   [address]                     CONSULTATION                  
_______________________________________________________________________________
 
Name:       NAILA HERNANDEZ            Room #:         209-P       [facility] [place]  
M.R.#:      [id number]                       Account #:      [id number]  
Admission:  06/23/20    Attend Phys:    Nick Choi MD     
Discharge:              Date of Birth:  [birth year]  
                                                          Report #: [id number]
                                                                    [id number]   
_______________________________________________________________________________
PULMONARY:  Denies shortness of breath, has intermittent cough without
hemoptysis.
CARDIAC:  Negative for chest pain, syncope or presyncope.
GASTROINTESTINAL:  Negative for abdominal pain, nausea, vomiting or changes in
bowel movement frequency.
NEUROLOGY:  Negative for loss of consciousness, seizure activity or severe
frequent headaches.
PSYCHIATRIC:  Negative for delusions, hallucinations.  Otherwise, review of
systems noncontributory other than those mentioned in HPI.
 
PAST MEDICAL HISTORY:
1.  Type 2 diabetes mellitus.
2.  Hypertension.
3.  Hyperlipidemia.
4.  Benign prostatic hypertrophy.
5.  Colonic polyps.
6.  Cataract surgery.
7.  Atrial fibrillation.
8.  Osteomyelitis, status post recent right big toe amputation.
 
OUTPATIENT MEDICATIONS:  Metoprolol 50 mg daily, finasteride 5 mg daily,
atorvastatin 40 mg daily, Lantus insulin 20 units q.p.m., Humalog insulin 10
units t.i.d. a.c.,  Flomax 0.4 mg daily.
 
ALLERGIES:  CODEINE.
 
FAMILY HISTORY:  Noncontributory.
 
SOCIAL HISTORY:  The patient lives with his son.  He works as a .  He
denies any use of tobacco, alcohol or illicit drugs.
 
PHYSICAL EXAMINATION:
GENERAL:  A pleasant -American male patient who is not in apparent pain
or distress.
VITAL SIGNS:  Blood pressure is 151/56 mmHg, heart rate is 55 beats per minute,
respirations 17 per minute, temperature 36.9 degrees Celsius.
CONSTITUTIONAL:  The patient is lying in bed comfortably, does not appear to be
in pain or distress.
HEENT:  Anicteric sclerae.  Intact extraocular motions.
NECK:  Supple, without carotid bruits, no thyromegaly.
CHEST:  Noted for moderate entry bilaterally with scattered rales.  No wheezes
or crackles.
HEART:  Regular rate and rhythm without murmurs or gallops.
ABDOMEN:  Soft, lax.  No guarding.  Active bowel sounds.
EXTREMITIES:  Lower extremity exam is noted for surgical wrapping over the right
foot, status post right great toe amputation, trace edema.
 
 
 
[address]   [address]                     CONSULTATION                  
_______________________________________________________________________________
 
Name:       NAILA HERNANDEZ            Room #:         209-P       [facility] [place]  
M.R.#:      [id number]                       Account #:      [id number]  
Admission:  06/23/20    Attend Phys:    Nick Choi MD     
Discharge:              Date of Birth:  [birth year]  
                                                          Report #: [id number]
                                                                    [id number]   
_______________________________________________________________________________
NEUROLOGIC:  Awake, alert and oriented to time, place and person.  The remainder
of his examination is nonfocal.
PSYCHIATRY:  Interactive, pleasant.  Normal mood and affect.
 
LABORATORY RESULTS:  Blood glucose values were reviewed in their entirety
throughout the patient's stay and evidently his blood glucose values have ranged
pretty consistently in the 110-180 mg/dL with very few aberrations.  Otherwise,
sodium 135, potassium 3.7, chloride 102, CO2 of 26, anion gap 7, BUN 12,
creatinine 1.3, AST 21.  Calcium 8.2, magnesium 1.7, eGFR 65.  Lactic acid 1.1. 
Total cholesterol 101, triglycerides 49, HDL 53, LDL 39.  .  White blood
count 7.6, hemoglobin 11.6, hematocrit 35.2, platelets 174.  Hemoglobin A1c
7.9%.
 
An x-ray done on the day of admission was consistent with soft tissue ulcer with
terminal tuft bone obstruction osteomyelitis.
 
ASSESSMENT AND PLAN:
1.  Type 2 diabetes mellitus.  As noted above, the patient has a longstanding
history of type 2 diabetes mellitus and is maintained on basal bolus insulin
therapy with reportedly adequate control.  His hemoglobin A1c proved to be at
7.9%, which is above target, but not by a large distance.  During his hospital
stay, the patient is being maintained on Humalog insulin 11 units with meals in
addition to Lantus insulin 10 units at night.  His blood glucose control has
been fair for the most part.  I believe the patient could benefit from advancing
his Lantus insulin dose slightly to 14 units, which I will do.  He is currently
on Humalog supplemental scale support, which I intend to maintain.  Blood
glucose monitoring will commence a.c. and at bedtime and further adjustments
will be made accordingly.  The patient was counseled about the importance of
achieving and maintaining adequate glycemic control both short term and long
term to avoid complications and perioperative complications which he seemed to
understand well.
2.  Hypertension.  The patient is currently on metoprolol with adequate blood
pressure and heart rate control, he is to continue with the same.
3.  Hyperlipidemia.  The patient is maintained on atorvastatin therapy, which he
tolerates well and with adequate lipid control, he is to continue with the same.
4.  Osteomyelitis.  The patient presented with osteomyelitis of the right great
toe and is status post amputation of that toe on 06/23/2020 with an
uncomplicated perioperative course.  He is currently on Zosyn therapy.  He is to
continue with the same.
 
I have reviewed the patient's clinical care notes, laboratory data, radiology
data, and other pertinent clinical information for over 35 minutes in addition
 
 
 
[address]   [address]                     CONSULTATION                  
_______________________________________________________________________________
 
Name:       JANE HERNANDEZVILLE            Room #:         209-P       [facility] [place]  
M.R.#:      [id number]                       Account #:      [id number]  
Admission:  06/23/20    Attend Phys:    Nick Choi MD     
Discharge:              Date of Birth:  [birth year]  
                                                          Report #: [id number]
                                                                    [id number]   
_______________________________________________________________________________
to my encounter time with the patient.  I certainly appreciate this consultation
by Dr. Monroe.
 
 
 
 
 
 
 
 
 
 
 
 
 
 
 
 
 
 
 
 
 
 
 
 
 
 
 
 
 
 
 
 
 
 
 
 
 
 
 
 
 
 
  <ELECTRONICALLY SIGNED>
   By: Joan Romano MD         
  06/29/20     1000
D: 06/26/20 1104                           _____________________________________
T: 06/26/20 1436                           Joan Romano MD           /nt

## 2020-06-29 NOTE — NUR
ASSUMED CARE AT SHIFT CHANGE ALERT AND ORIENTED X4, DENEIS ANY DISCOMFORT AND
VSS. ASSESSMENT AS DOCUMENTED. DR GONZALES NOTIFIED ABOUT THE PATIENT REACTION TO
ZOSYN, AND NEW ORDERS RECIEVED.
S/B WOUNDCARE TEAM, AND DRESSING CHANGED TODAY.
PROGRESSING TOWARDS GOALS AND WILL CONTINUE TO MONITOR.

## 2020-06-29 NOTE — HC
[facility]
[address]
[place]   [address]                     CONSULTATION                  
_______________________________________________________________________________
 
Name:       NAILA HERNANDEZ            Room #:         209-P       ADM IN  
M.R.#:      [id number]                       Account #:      [id number]  
Admission:  06/23/20    Attend Phys:    Nick Choi MD     
Discharge:              Date of Birth:  [birth year]  
                                                          Report #: [id number]
                                                                    [id number]   
_______________________________________________________________________________
THIS REPORT FOR:  
 
cc:  Abisai De Leon,Alexander Jerome MD                                        ~
CC: Abisai Choi
 
DATE OF SERVICE:  06/25/2020
 
 
CHIEF COMPLAINT:  Right great toe wound.
 
HISTORY OF PRESENT ILLNESS:  This is a 74-year-old man with a history of type 2
diabetes mellitus, hypertension, hyperlipidemia, who had pain and bleeding from
his right great toe.  He had been seen by a podiatrist.  He was noted to have a
possible osteo involving osteomyelitis of the tip of the toe.  He was admitted
for further evaluation and treatment.  The patient denies significant pain
associated with this.  He has already undergone amputation of the tip of the toe
by orthopedics.  I have been asked by ortho to see him for postoperative care.
 
PAST MEDICAL AND SURGICAL HISTORY:  Positive for diabetes, bilateral cataract
surgery, bilateral hernia repair, polyps removed during colonoscopy.
 
MEDICATIONS:  Include metoprolol, finasteride, atorvastatin and insulin.
 
SOCIAL HISTORY:  Negative for alcohol use.  Positive for smoking cigarettes 1
pack per day for 15 years and quit greater than 1 year ago.
 
ALLERGIES:  CODEINE.
 
FAMILY HISTORY:  Noncontributory.
 
REVIEW OF SYSTEMS:
CONSTITUTIONAL:  The patient denies fever, chills or weight loss.
NEUROLOGICAL:  The patient denies focal weakness, numbness or tingling.
EYES:  The patient denies visual changes, redness, or drainage.
ENT:  The patient denies earache, nasal drainage or sore throat.
CARDIOVASCULAR:  The patient denies chest pain, palpitations or diaphoresis.
PULMONARY:  The patient denies cough or shortness of breath.
GASTROINTESTINAL:  The patient denies nausea, vomiting, diarrhea or abdominal
pain.
ORTHOPEDIC:  The patient notes the surgical wound involving his right great toe.
Other systems in 14-point review of systems are negative.
 
PHYSICAL EXAMINATION:
 
 
 
[facility]
[address] [place]   [address]                     CONSULTATION                  
_______________________________________________________________________________
 
Name:       NAILA HERNANDEZ            Room #:         209-P       [facility] [place]  
M.R.#:      [id number]                       Account #:      [id number]  
Admission:  06/23/20    Attend Phys:    Nick Choi MD     
Discharge:              Date of Birth:  [birth year]  
                                                          Report #: [id number]
                                                                    [id number]   
_______________________________________________________________________________
VITAL SIGNS:  At this time include temperature 36.9, pulse 50, respiratory rate
19, blood pressure 130/52.
GENERAL:  This is a well-developed male patient who appears to be in minimal
distress.
HEENT:  Head is normocephalic.  Nose and throat clear.
NECK:  Supple.
LUNGS:  Clear.
ABDOMEN:  Soft.  Bowel sounds present.
EXTREMITIES:  Lower extremities, the right foot demonstrates the skin is warm
and dry with good capillary refill.  He has a surgical incision line with a flap
closure involving the right great toe after amputation of the distal portion of
the great toe.
 
CLINICAL IMPRESSION:
1.  Surgical wound to the right great toe, status post amputation of the distal
phalanx due to underlying osteomyelitis.
2.  Type 2 diabetes mellitus.
3.  Hypertension.
4.  Hyperlipidemia.
 
RECOMMENDATIONS:  At this point in time, we will recommend topical Xeroform
gauze and a dry gauze secondary dressing to be changed daily.  We will leave the
sutures in for 2-3 weeks.  We will recommend a postop shoe for any ambulation in
the short term.  He is on intravenous antibiotic therapy per Infectious Disease.
 I appreciate being asked to see the patient in consultation.
 
 
 
 
 
 
 
 
 
 
 
 
 
 
 
 
 
 
 
  <ELECTRONICALLY SIGNED>
   By: Alexander Redmond MD        
  06/29/20     0810
D: 06/26/20 1736                           _____________________________________
T: 06/26/20 2200                           Alexander Redmond MD          /nt

## 2020-06-30 VITALS — DIASTOLIC BLOOD PRESSURE: 59 MMHG | SYSTOLIC BLOOD PRESSURE: 132 MMHG

## 2020-06-30 VITALS — DIASTOLIC BLOOD PRESSURE: 64 MMHG | SYSTOLIC BLOOD PRESSURE: 135 MMHG

## 2020-06-30 VITALS — DIASTOLIC BLOOD PRESSURE: 66 MMHG | SYSTOLIC BLOOD PRESSURE: 148 MMHG

## 2020-06-30 VITALS — SYSTOLIC BLOOD PRESSURE: 157 MMHG | DIASTOLIC BLOOD PRESSURE: 58 MMHG

## 2020-06-30 VITALS — SYSTOLIC BLOOD PRESSURE: 145 MMHG | DIASTOLIC BLOOD PRESSURE: 48 MMHG

## 2020-06-30 LAB
ANION GAP SERPL CALC-SCNC: 12 MMOL/L (ref 7–16)
BUN SERPL-MCNC: 11 MG/DL (ref 7–18)
CALCIUM SERPL-MCNC: 8.5 MG/DL (ref 8.5–10.1)
CHLORIDE SERPL-SCNC: 103 MMOL/L (ref 98–107)
CO2 SERPL-SCNC: 22 MMOL/L (ref 21–32)
CREAT SERPL-MCNC: 1.6 MG/DL (ref 0.7–1.3)
ERYTHROCYTE [DISTWIDTH] IN BLOOD BY AUTOMATED COUNT: 14.1 % (ref 10.5–14.5)
GLUCOSE SERPL-MCNC: 234 MG/DL (ref 74–106)
HCT VFR BLD CALC: 35.6 % (ref 42–52)
HGB BLD-MCNC: 12.1 GM/DL (ref 14–18)
MAGNESIUM SERPL-MCNC: 1.8 MG/DL (ref 1.8–2.4)
MCH RBC QN AUTO: 31.1 PG (ref 26–34)
MCHC RBC AUTO-ENTMCNC: 33.9 G/DL (ref 28–37)
MCV RBC: 91.7 FL (ref 80–100)
PLATELET # BLD: 278 THOU/UL (ref 150–400)
POTASSIUM SERPL-SCNC: 4.3 MMOL/L (ref 3.5–5.1)
RBC # BLD AUTO: 3.88 MIL/UL (ref 4.5–6)
SODIUM SERPL-SCNC: 137 MMOL/L (ref 136–145)
WBC # BLD AUTO: 9 THOU/UL (ref 4–11)

## 2020-06-30 NOTE — NUR
ASSUMED CARE AT SHIFT CHANGE, ALERT AND ORIENTED AND DENIES ANY DISCOMFORT.
PROGRESSING TOWARDS GOAL AND PLAN IS TO DISCHARGE PATIENT HOME GREER.
AND WILL CONTINUE WITH POC.

## 2020-07-01 VITALS — DIASTOLIC BLOOD PRESSURE: 80 MMHG | SYSTOLIC BLOOD PRESSURE: 146 MMHG

## 2020-07-01 VITALS — SYSTOLIC BLOOD PRESSURE: 146 MMHG | DIASTOLIC BLOOD PRESSURE: 80 MMHG

## 2020-07-01 VITALS — SYSTOLIC BLOOD PRESSURE: 136 MMHG | DIASTOLIC BLOOD PRESSURE: 64 MMHG

## 2020-07-01 VITALS — DIASTOLIC BLOOD PRESSURE: 53 MMHG | SYSTOLIC BLOOD PRESSURE: 133 MMHG

## 2020-07-01 VITALS — DIASTOLIC BLOOD PRESSURE: 55 MMHG | SYSTOLIC BLOOD PRESSURE: 137 MMHG

## 2020-07-01 LAB
ANION GAP SERPL CALC-SCNC: 8 MMOL/L (ref 7–16)
BUN SERPL-MCNC: 13 MG/DL (ref 7–18)
CALCIUM SERPL-MCNC: 8.3 MG/DL (ref 8.5–10.1)
CHLORIDE SERPL-SCNC: 105 MMOL/L (ref 98–107)
CO2 SERPL-SCNC: 25 MMOL/L (ref 21–32)
CREAT SERPL-MCNC: 1.4 MG/DL (ref 0.7–1.3)
ERYTHROCYTE [DISTWIDTH] IN BLOOD BY AUTOMATED COUNT: 14 % (ref 10.5–14.5)
GLUCOSE SERPL-MCNC: 100 MG/DL (ref 74–106)
HCT VFR BLD CALC: 32.1 % (ref 42–52)
HGB BLD-MCNC: 10.5 GM/DL (ref 14–18)
MAGNESIUM SERPL-MCNC: 1.9 MG/DL (ref 1.8–2.4)
MCH RBC QN AUTO: 29.7 PG (ref 26–34)
MCHC RBC AUTO-ENTMCNC: 32.8 G/DL (ref 28–37)
MCV RBC: 90.7 FL (ref 80–100)
PLATELET # BLD: 286 THOU/UL (ref 150–400)
POTASSIUM SERPL-SCNC: 4.1 MMOL/L (ref 3.5–5.1)
RBC # BLD AUTO: 3.54 MIL/UL (ref 4.5–6)
SODIUM SERPL-SCNC: 138 MMOL/L (ref 136–145)
WBC # BLD AUTO: 8.5 THOU/UL (ref 4–11)

## 2020-07-01 NOTE — NUR
ASSUMED CARE AT SHIFT CHANGE, ALERT AND ORIENTED X4. S/B DR SUAZO, AND
DISCHARGE AND MEDICATION INSTRUCTIONS GIVEN, AND PATIENT C/O NAUSEA AND STATED
THAT HE THINKS THAT HIS BG IS LOW, BG AT 47 TREATED,INFORMED DR SKELTON,WILL CONTINUE TO MONITOR AND NOTIFY DR SUAZO.

## 2020-07-01 NOTE — NUR
SLEPT MOST OF SHIFT. HAD NAUSEA AT 0300 AND ZOFRAN PO GIVEN WITH RELIEF. SLEPT
PAST NAUSEA MED. WORKING ON GOALS AND PLAN OF CARE FOR NOC. UP WITH KNEE
SCOOTER AD KINSEY. PROGRESSING SLOWLY TOWARDS DISCHARGE GOALS. CONTINUE TO ASSES
CLOSELY.

## 2020-07-01 NOTE — EKG
[facility]
[address]
[place]   [address]                     ELECTROCARDIOGRAM REPORT      
_______________________________________________________________________________
 
Name:       NAILA HERNANDEZ            Room #:         209-P       ADM [place]  
M.R.#:      [id number]                       Account #:      [id number]  
Admission:  20    Attend Phys:    Nick Choi MD     
Discharge:              Date of Birth:  [birth year]  
                                                          Report #: [id number]
                                                                    [id number]
_______________________________________________________________________________
THIS REPORT FOR:  
 
cc:  Abisai De Leon David J. DO Lundgren,David OKEEFE MD [facility]                                        ~
THIS REPORT FOR:   //name//                          
 
                          [facility]
                                       
Test Date:    2020               Test Time:    07:50:36
Pat Name:     NAILA HERNANDEZ       Department:   
Patient ID:   [id number]            Room:         209 
Gender:       M                        Technician:   LAINA FINE
:          1945               Requested By: Radha Dinero
Order Number: [id number] MD:   David Yin
                                 Measurements
Intervals                              Axis          
Rate:         57                       P:            48
UT:           164                      QRS:          63
QRSD:         90                       T:            251
QT:           451                                    
QTc:          440                                    
                           Interpretive Statements
Sinus rhythm
Atrial premature complexes 
Abnormal T, consider ischemia, diffuse leads
Compared to ECG 2020 18:58:02
Atrial premature complex(es) now present
Electronically Signed On 2020 8:20:42 CDT by David Yin
[email or web address]
 
 
 
 
 
 
 
 
 
 
 
 
 
 
  <ELECTRONICALLY SIGNED>
   By: David Yin MD, [facility]   
  20
D: 20                           _____________________________________
T: 20                           David Yin MD, FAC     /EPI

## 2020-07-17 ENCOUNTER — HOSPITAL ENCOUNTER (OUTPATIENT)
Dept: HOSPITAL 35 - HYPER | Age: 75
End: 2020-07-17
Attending: EMERGENCY MEDICINE
Payer: COMMERCIAL
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
## 2020-07-17 DIAGNOSIS — E11.40: ICD-10-CM

## 2020-07-17 DIAGNOSIS — E11.51: ICD-10-CM
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
## 2020-07-17 DIAGNOSIS — L97.511: ICD-10-CM

## 2020-07-17 DIAGNOSIS — E11.621: ICD-10-CM

## 2020-07-17 DIAGNOSIS — Z87.39: ICD-10-CM

## 2020-07-17 DIAGNOSIS — Z79.4: ICD-10-CM

## 2020-07-17 DIAGNOSIS — Z87.891: ICD-10-CM

## 2020-07-17 DIAGNOSIS — Z98.49: ICD-10-CM

## 2020-07-17 DIAGNOSIS — T87.89: Primary | ICD-10-CM

## 2020-07-17 DIAGNOSIS — Z79.82: ICD-10-CM
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
## 2020-07-17 DIAGNOSIS — S98.111A: ICD-10-CM

## 2020-07-17 DIAGNOSIS — I10: ICD-10-CM

## 2020-07-17 DIAGNOSIS — E11.69: ICD-10-CM

## 2020-07-17 DIAGNOSIS — M86.171: ICD-10-CM
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
## 2020-07-17 DIAGNOSIS — E11.21: ICD-10-CM

## 2020-07-27 ENCOUNTER — HOSPITAL ENCOUNTER (OUTPATIENT)
Dept: HOSPITAL 35 - HYPER | Age: 75
End: 2020-07-27
Attending: PREVENTIVE MEDICINE
Payer: COMMERCIAL

## 2020-07-27 DIAGNOSIS — Z87.39: ICD-10-CM

## 2020-07-27 DIAGNOSIS — E11.51: ICD-10-CM

## 2020-07-27 DIAGNOSIS — Z79.82: ICD-10-CM

## 2020-07-27 DIAGNOSIS — Z87.891: ICD-10-CM

## 2020-07-27 DIAGNOSIS — E11.40: ICD-10-CM

## 2020-07-27 DIAGNOSIS — E11.69: ICD-10-CM

## 2020-07-27 DIAGNOSIS — I10: ICD-10-CM
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
## 2020-07-27 DIAGNOSIS — L97.516: ICD-10-CM

## 2020-07-27 DIAGNOSIS — E11.621: ICD-10-CM

## 2020-07-27 DIAGNOSIS — T87.89: Primary | ICD-10-CM

## 2020-07-27 DIAGNOSIS — Z79.4: ICD-10-CM
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
## 2020-07-27 DIAGNOSIS — M86.9: ICD-10-CM

## 2020-07-27 DIAGNOSIS — E11.21: ICD-10-CM

## 2020-07-27 DIAGNOSIS — S98.111D: ICD-10-CM

## 2020-08-14 ENCOUNTER — HOSPITAL ENCOUNTER (OUTPATIENT)
Dept: HOSPITAL 35 - HYPER | Age: 75
End: 2020-08-14
Attending: EMERGENCY MEDICINE
Payer: COMMERCIAL
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
## 2020-08-14 DIAGNOSIS — S98.111D: ICD-10-CM

## 2020-08-14 DIAGNOSIS — T87.89: Primary | ICD-10-CM

## 2020-08-14 DIAGNOSIS — M86.171: ICD-10-CM

## 2020-08-14 DIAGNOSIS — E11.21: ICD-10-CM

## 2020-08-14 DIAGNOSIS — I10: ICD-10-CM

## 2020-08-14 DIAGNOSIS — E11.51: ICD-10-CM

## 2020-08-14 DIAGNOSIS — L97.516: ICD-10-CM

## 2020-08-14 DIAGNOSIS — Z79.82: ICD-10-CM

## 2020-08-14 DIAGNOSIS — Z87.891: ICD-10-CM

## 2020-08-14 DIAGNOSIS — E11.69: ICD-10-CM

## 2020-08-14 DIAGNOSIS — E11.621: ICD-10-CM

## 2020-08-14 DIAGNOSIS — Z79.4: ICD-10-CM
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
## 2020-08-14 DIAGNOSIS — E11.40: ICD-10-CM

## 2020-08-28 ENCOUNTER — HOSPITAL ENCOUNTER (OUTPATIENT)
Dept: HOSPITAL 35 - HYPER | Age: 75
End: 2020-08-28
Attending: PREVENTIVE MEDICINE
Payer: COMMERCIAL

## 2020-08-28 DIAGNOSIS — Z87.891: ICD-10-CM

## 2020-08-28 DIAGNOSIS — I10: ICD-10-CM

## 2020-08-28 DIAGNOSIS — E11.51: ICD-10-CM

## 2020-08-28 DIAGNOSIS — Z79.82: ICD-10-CM

## 2020-08-28 DIAGNOSIS — E11.69: ICD-10-CM
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
## 2020-08-28 DIAGNOSIS — S98.111D: ICD-10-CM

## 2020-08-28 DIAGNOSIS — L97.511: ICD-10-CM

## 2020-08-28 DIAGNOSIS — Z79.4: ICD-10-CM

## 2020-08-28 DIAGNOSIS — E11.21: ICD-10-CM

## 2020-08-28 DIAGNOSIS — Z87.39: ICD-10-CM
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
## 2020-08-28 DIAGNOSIS — T87.89: Primary | ICD-10-CM

## 2020-08-28 DIAGNOSIS — E11.40: ICD-10-CM

## 2020-08-28 DIAGNOSIS — E11.621: ICD-10-CM

## 2020-08-28 DIAGNOSIS — M86.171: ICD-10-CM

## 2020-09-11 ENCOUNTER — HOSPITAL ENCOUNTER (OUTPATIENT)
Dept: HOSPITAL 35 - HYPER | Age: 75
End: 2020-09-11
Attending: PREVENTIVE MEDICINE
Payer: COMMERCIAL

## 2020-09-11 DIAGNOSIS — S98.111D: ICD-10-CM

## 2020-09-11 DIAGNOSIS — Z87.891: ICD-10-CM

## 2020-09-11 DIAGNOSIS — I10: ICD-10-CM

## 2020-09-11 DIAGNOSIS — Z79.4: ICD-10-CM

## 2020-09-11 DIAGNOSIS — L97.512: ICD-10-CM

## 2020-09-11 DIAGNOSIS — Z87.39: ICD-10-CM
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
## 2020-09-11 DIAGNOSIS — E11.69: ICD-10-CM

## 2020-09-11 DIAGNOSIS — T87.89: Primary | ICD-10-CM

## 2020-09-11 DIAGNOSIS — E11.51: ICD-10-CM
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
## 2020-09-11 DIAGNOSIS — M86.171: ICD-10-CM

## 2020-09-11 DIAGNOSIS — E11.621: ICD-10-CM

## 2020-09-11 DIAGNOSIS — E11.21: ICD-10-CM

## 2020-09-11 DIAGNOSIS — Z79.82: ICD-10-CM

## 2020-09-25 ENCOUNTER — HOSPITAL ENCOUNTER (OUTPATIENT)
Dept: HOSPITAL 35 - HYPER | Age: 75
End: 2020-09-25
Attending: EMERGENCY MEDICINE
Payer: COMMERCIAL

## 2020-09-25 DIAGNOSIS — E11.69: ICD-10-CM

## 2020-09-25 DIAGNOSIS — M86.171: ICD-10-CM
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
## 2020-09-25 DIAGNOSIS — L97.516: ICD-10-CM

## 2020-09-25 DIAGNOSIS — E11.621: ICD-10-CM

## 2020-09-25 DIAGNOSIS — S98.111D: ICD-10-CM

## 2020-09-25 DIAGNOSIS — E11.21: ICD-10-CM

## 2020-09-25 DIAGNOSIS — E11.51: ICD-10-CM

## 2020-09-25 DIAGNOSIS — Z87.39: ICD-10-CM
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
## 2020-09-25 DIAGNOSIS — Z79.82: ICD-10-CM

## 2020-09-25 DIAGNOSIS — E11.40: ICD-10-CM

## 2020-09-25 DIAGNOSIS — L84: ICD-10-CM

## 2020-09-25 DIAGNOSIS — Z87.891: ICD-10-CM

## 2020-09-25 DIAGNOSIS — T87.89: Primary | ICD-10-CM

## 2020-09-25 DIAGNOSIS — Z79.4: ICD-10-CM

## 2020-09-25 DIAGNOSIS — I10: ICD-10-CM

## 2020-10-09 ENCOUNTER — HOSPITAL ENCOUNTER (OUTPATIENT)
Dept: HOSPITAL 35 - HYPER | Age: 75
End: 2020-10-09
Attending: EMERGENCY MEDICINE
Payer: COMMERCIAL
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
## 2020-10-09 DIAGNOSIS — E11.51: ICD-10-CM

## 2020-10-09 DIAGNOSIS — L97.516: ICD-10-CM

## 2020-10-09 DIAGNOSIS — E11.40: ICD-10-CM

## 2020-10-09 DIAGNOSIS — Z87.39: ICD-10-CM

## 2020-10-09 DIAGNOSIS — E11.621: ICD-10-CM

## 2020-10-09 DIAGNOSIS — S98.111D: ICD-10-CM

## 2020-10-09 DIAGNOSIS — Z87.891: ICD-10-CM

## 2020-10-09 DIAGNOSIS — M86.171: ICD-10-CM

## 2020-10-09 DIAGNOSIS — Z79.82: ICD-10-CM

## 2020-10-09 DIAGNOSIS — I10: ICD-10-CM

## 2020-10-09 DIAGNOSIS — E11.21: ICD-10-CM

## 2020-10-09 DIAGNOSIS — Z79.4: ICD-10-CM

## 2020-10-09 DIAGNOSIS — E11.69: ICD-10-CM
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
## 2020-10-09 DIAGNOSIS — T87.89: Primary | ICD-10-CM

## 2020-10-23 ENCOUNTER — HOSPITAL ENCOUNTER (OUTPATIENT)
Dept: HOSPITAL 35 - HYPER | Age: 75
End: 2020-10-23
Attending: EMERGENCY MEDICINE
Payer: COMMERCIAL

## 2020-10-23 DIAGNOSIS — R23.4: ICD-10-CM

## 2020-10-23 DIAGNOSIS — S98.111D: ICD-10-CM

## 2020-10-23 DIAGNOSIS — M86.171: ICD-10-CM

## 2020-10-23 DIAGNOSIS — E11.40: ICD-10-CM

## 2020-10-23 DIAGNOSIS — Z79.4: ICD-10-CM

## 2020-10-23 DIAGNOSIS — E11.21: ICD-10-CM

## 2020-10-23 DIAGNOSIS — I10: ICD-10-CM

## 2020-10-23 DIAGNOSIS — T87.89: Primary | ICD-10-CM

## 2020-10-23 DIAGNOSIS — E11.621: ICD-10-CM

## 2020-10-23 DIAGNOSIS — E11.69: ICD-10-CM
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
## 2020-10-23 DIAGNOSIS — Z87.891: ICD-10-CM

## 2020-10-23 DIAGNOSIS — E11.51: ICD-10-CM

## 2020-10-23 DIAGNOSIS — Z79.82: ICD-10-CM
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
## 2020-10-23 DIAGNOSIS — L97.512: ICD-10-CM

## 2020-10-23 DIAGNOSIS — Z87.39: ICD-10-CM

## 2020-10-30 ENCOUNTER — HOSPITAL ENCOUNTER (OUTPATIENT)
Dept: HOSPITAL 35 - CAT | Age: 75
End: 2020-10-30
Attending: FAMILY MEDICINE
Payer: COMMERCIAL

## 2020-10-30 DIAGNOSIS — M25.78: ICD-10-CM

## 2020-10-30 DIAGNOSIS — Z12.2: Primary | ICD-10-CM

## 2020-10-30 DIAGNOSIS — I70.0: ICD-10-CM

## 2020-10-30 DIAGNOSIS — Z87.891: ICD-10-CM

## 2020-10-30 DIAGNOSIS — J84.10: ICD-10-CM

## 2020-10-30 DIAGNOSIS — J43.9: ICD-10-CM

## 2020-10-30 DIAGNOSIS — I25.10: ICD-10-CM

## 2021-04-04 ENCOUNTER — HOSPITAL ENCOUNTER (EMERGENCY)
Dept: HOSPITAL 35 - ER | Age: 76
Discharge: HOME | End: 2021-04-04
Payer: COMMERCIAL

## 2021-04-04 VITALS — SYSTOLIC BLOOD PRESSURE: 144 MMHG | DIASTOLIC BLOOD PRESSURE: 65 MMHG

## 2021-04-04 VITALS — WEIGHT: 210.01 LBS | BODY MASS INDEX: 24.8 KG/M2 | HEIGHT: 77 IN

## 2021-04-04 DIAGNOSIS — Z88.5: ICD-10-CM
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
## 2021-04-04 DIAGNOSIS — Z88.8: ICD-10-CM

## 2021-04-04 DIAGNOSIS — Z79.899: ICD-10-CM

## 2021-04-04 DIAGNOSIS — Z88.1: ICD-10-CM

## 2021-04-04 DIAGNOSIS — Z79.4: ICD-10-CM
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
## 2021-04-04 DIAGNOSIS — E11.9: ICD-10-CM
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
## 2021-04-04 DIAGNOSIS — S80.811A: Primary | ICD-10-CM

## 2021-04-04 DIAGNOSIS — Z79.01: ICD-10-CM

## 2022-04-07 NOTE — 2DMMODE
Component of whitecoat syndrome, for now observe, no medications, low-salt diet   [facility]
[place]   [address]                   2 D/M-MODE ECHOCARDIOGRAM     
_______________________________________________________________________________
 
Name:       NAILA HERNANDEZ            Room #:         209-P       ADM IN  
M.R.#:      [id number]                       Account #:      [id number]  
Admission:  06/23/20    Attend Phys:    Nick Choi MD     
Discharge:              Date of Birth:  [birth year]  
                                                          Report #: [id number]
                                                                    [id number]
_______________________________________________________________________________
THIS REPORT FOR:  
 
cc:  Abisai De Leon David J. DO Park,Navin TORRES MD                                                   
                                                                       ~
 
--------------- APPROVED REPORT --------------
 
 
Study performed:  06/24/2020 14:04:43
 
EXAM: Comprehensive 2D, Doppler, and color-flow 
Echocardiogram 
Patient Location: Bedside   
Room #:  209     Status:  routine
 
       BSA:         2.24
HR: 58 bpm  BP:          133/62 mmHg 
Rhythm: Irregular     
 
Other Information 
Study Quality: Good
 
Indications
V-Tach.
Hx: Afib, HTN, HLP, DM, tob  abuse.
 
2D Dimensions
RVDd:  38.98 mm  
IVSd:  15.38 (7-11mm) LVOT Diam:  22.00 (18-24mm) 
LVDd:  51.32 mm  
PWd:  14.00 (7-11mm) Ascending Ao:  34.31 (22-36mm)
LVDs:  34.00 (25-40mm) 
Aortic Root:  32.54 mm 
 
Volumes
Left Atrial Volume (Systole) 
Single Plane 4CH:  71.11 mL Single Plane 2CH:  103.30 mL
    LA ESV Index:  41.00 mL/m2
 
Aortic Valve
AoV Peak James.:  1.37 m/s 
AO Peak Gr.:  7.48 mmHg  LVOT Max PG:  3.83 mmHg
    LVOT Max V:  0.98 m/s
JUAN Vmax: 2.69 cm2  
 
 
[facility]
[address]
[place]  [address]
Phone:  [phone]                    2 D/M-MODE ECHOCARDIOGRAM     
_______________________________________________________________________________
 
Name:            MARYNAILA            Room #:        209-P       [facility] [place]
.R.#:           [id number]          Account #:     [id number]  
Admission:       06/23/20         Attend Phys:   Nick Choi MD 
Discharge:                  Date of Birth: [birth year]  
                         Report #:      [id number]
        [id number]
_______________________________________________________________________________
 
Mitral Valve
    E/A Ratio:  2.0
    MV Decel. Time:  300.26 ms
MV E Max James.:  0.69 m/s 
MV A James.:  0.35 m/s  
MV PHT:  87.07 ms  
IVRT:  79.58 ms   
 
Pulmonary Valve
PV Peak James.:  0.75 m/s PV Peak Gr.:  2.24 mmHg
 
Pulmonary Vein
P Vein S:    0.40 m/s 
P Vein D:   0.67 m/s 
P Vein S/D Ratio:  0.60 
 
Tricuspid Valve
TR Peak James.:  2.76 m/s  RAP Estimate:  10.00 mmHg
TR Peak Gr.:  31.12 mmHg 
    PA Pressure:  41.00 mmHg
 
Left Ventricle
The left ventricle is normal size. There is normal LV segmental wall 
motion. Moderate concentric left ventricular hypertrophy. Left 
ventricular systolic function is normal. LVEF is 60-65%.
 
Right Ventricle
The right ventricle is normal size. The right ventricular systolic 
function is normal.
 
Atria
Left atrium is moderately dilated. Right atrium is mildly 
dilated.
 
Aortic Valve
The aortic valve is normal in structure. Trace aortic regurgitation. 
There is no aortic valvular stenosis.
 
Mitral Valve
The mitral valve is normal in structure. Mild mitral regurgitation. 
No evidence of mitral valve stenosis.
 
Tricuspid Valve
The tricuspid valve is normal in structure. Mild to moderate 
tricuspid regurgitation. Estimated PAP is 40mmHg.
 
 
[facility]
[address]
[place]  [address]
Phone:  [phone]                    2 D/M-MODE ECHOCARDIOGRAM     
_______________________________________________________________________________
 
Name:            JANE HERNANDEZVILLE            Room #:        209-P       ADM IN
M.R.#:           [id number]          Account #:     [id number]  
Admission:       06/23/20         Attend Phys:   Nick Choi MD 
Discharge:                  Date of Birth: [birth year]  
                         Report #:      [id number]
        [id number]
_______________________________________________________________________________
 
Pulmonic Valve
The pulmonary valve is normal in structure. Trace pulmonic 
regurgitation.
 
Great Vessels
The aortic root is normal in size. The ascending aorta is normal in 
size. IVC is dilated and partially collapses.
 
Pericardium
There is no pericardial effusion.
 
<Conclusion>
The left ventricle is normal size.
Moderate concentric left ventricular hypertrophy.
Left ventricular systolic function is normal.
The right ventricle is normal size.
Left atrium is moderately dilated.
The aortic valve is normal in structure.
Mild mitral regurgitation.
Mild to moderate tricuspid regurgitation. Estimated PAP is 40mmHg.
 
 
 
 
 
 
 
 
 
 
 
 
 
 
 
 
 
 
 
 
 
 
 
  <ELECTRONICALLY SIGNED>
   By: Navin Smart MD               
  06/24/20     1501
D: 06/24/20 1501                           _____________________________________
T: 06/24/20 1501                           Navin Smart MD                 /INF

## 2023-05-31 NOTE — NUR
Spoke with patient and therapist at discharge anticipate no needs for  care. [Negative] : Genitourinary